# Patient Record
Sex: MALE | Race: WHITE | NOT HISPANIC OR LATINO | Employment: UNEMPLOYED | URBAN - METROPOLITAN AREA
[De-identification: names, ages, dates, MRNs, and addresses within clinical notes are randomized per-mention and may not be internally consistent; named-entity substitution may affect disease eponyms.]

---

## 2017-01-23 RX ORDER — BUPROPION HYDROCHLORIDE 150 MG/1
TABLET ORAL
Qty: 90 TABLET | Refills: 0 | OUTPATIENT
Start: 2017-01-23

## 2018-04-04 ENCOUNTER — HOSPITAL ENCOUNTER (EMERGENCY)
Facility: HOSPITAL | Age: 53
Discharge: PSYCHIATRIC OR SUBSTANCE USE FACILITY | End: 2018-04-05
Attending: EMERGENCY MEDICINE
Payer: MEDICAID

## 2018-04-04 DIAGNOSIS — R45.851 SUICIDAL IDEATION: Primary | ICD-10-CM

## 2018-04-04 LAB
BASOPHILS # BLD AUTO: 0.11 K/UL
BASOPHILS NFR BLD: 1.2 %
DIFFERENTIAL METHOD: NORMAL
EOSINOPHIL # BLD AUTO: 0.1 K/UL
EOSINOPHIL NFR BLD: 1.1 %
ERYTHROCYTE [DISTWIDTH] IN BLOOD BY AUTOMATED COUNT: 12.9 %
HCT VFR BLD AUTO: 46.2 %
HGB BLD-MCNC: 15.3 G/DL
IMM GRANULOCYTES # BLD AUTO: 0.03 K/UL
IMM GRANULOCYTES NFR BLD AUTO: 0.3 %
LYMPHOCYTES # BLD AUTO: 2.6 K/UL
LYMPHOCYTES NFR BLD: 28.6 %
MCH RBC QN AUTO: 28.9 PG
MCHC RBC AUTO-ENTMCNC: 33.1 G/DL
MCV RBC AUTO: 87 FL
MONOCYTES # BLD AUTO: 0.4 K/UL
MONOCYTES NFR BLD: 4.2 %
NEUTROPHILS # BLD AUTO: 5.9 K/UL
NEUTROPHILS NFR BLD: 64.6 %
NRBC BLD-RTO: 0 /100 WBC
PLATELET # BLD AUTO: 342 K/UL
PMV BLD AUTO: 10.7 FL
RBC # BLD AUTO: 5.3 M/UL
WBC # BLD AUTO: 9.14 K/UL

## 2018-04-04 PROCEDURE — 84443 ASSAY THYROID STIM HORMONE: CPT

## 2018-04-04 PROCEDURE — 81003 URINALYSIS AUTO W/O SCOPE: CPT

## 2018-04-04 PROCEDURE — 85025 COMPLETE CBC W/AUTO DIFF WBC: CPT

## 2018-04-04 PROCEDURE — 80320 DRUG SCREEN QUANTALCOHOLS: CPT

## 2018-04-04 PROCEDURE — 80307 DRUG TEST PRSMV CHEM ANLYZR: CPT

## 2018-04-04 PROCEDURE — 80053 COMPREHEN METABOLIC PANEL: CPT

## 2018-04-04 PROCEDURE — 80329 ANALGESICS NON-OPIOID 1 OR 2: CPT

## 2018-04-05 VITALS
OXYGEN SATURATION: 97 % | WEIGHT: 230 LBS | HEIGHT: 75 IN | BODY MASS INDEX: 28.6 KG/M2 | DIASTOLIC BLOOD PRESSURE: 82 MMHG | SYSTOLIC BLOOD PRESSURE: 128 MMHG | RESPIRATION RATE: 16 BRPM | HEART RATE: 51 BPM | TEMPERATURE: 98 F

## 2018-04-05 LAB
ALBUMIN SERPL BCP-MCNC: 4.1 G/DL
ALP SERPL-CCNC: 53 U/L
ALT SERPL W/O P-5'-P-CCNC: 25 U/L
AMPHET+METHAMPHET UR QL: NEGATIVE
ANION GAP SERPL CALC-SCNC: 9 MMOL/L
APAP SERPL-MCNC: <3 UG/ML
AST SERPL-CCNC: 22 U/L
BARBITURATES UR QL SCN>200 NG/ML: NEGATIVE
BENZODIAZ UR QL SCN>200 NG/ML: NEGATIVE
BILIRUB SERPL-MCNC: 0.5 MG/DL
BILIRUB UR QL STRIP: NEGATIVE
BUN SERPL-MCNC: 28 MG/DL
BZE UR QL SCN: NEGATIVE
CALCIUM SERPL-MCNC: 9.2 MG/DL
CANNABINOIDS UR QL SCN: NEGATIVE
CHLORIDE SERPL-SCNC: 108 MMOL/L
CLARITY UR REFRACT.AUTO: ABNORMAL
CO2 SERPL-SCNC: 24 MMOL/L
COLOR UR AUTO: YELLOW
CREAT SERPL-MCNC: 1.2 MG/DL
CREAT UR-MCNC: 370 MG/DL
EST. GFR  (AFRICAN AMERICAN): >60 ML/MIN/1.73 M^2
EST. GFR  (NON AFRICAN AMERICAN): >60 ML/MIN/1.73 M^2
ETHANOL SERPL-MCNC: <10 MG/DL
GLUCOSE SERPL-MCNC: 207 MG/DL
GLUCOSE UR QL STRIP: NEGATIVE
HGB UR QL STRIP: NEGATIVE
KETONES UR QL STRIP: ABNORMAL
LEUKOCYTE ESTERASE UR QL STRIP: NEGATIVE
METHADONE UR QL SCN>300 NG/ML: NEGATIVE
NITRITE UR QL STRIP: NEGATIVE
OPIATES UR QL SCN: NEGATIVE
PCP UR QL SCN>25 NG/ML: NEGATIVE
PH UR STRIP: 5 [PH] (ref 5–8)
POTASSIUM SERPL-SCNC: 4.1 MMOL/L
PROT SERPL-MCNC: 7.4 G/DL
PROT UR QL STRIP: NEGATIVE
SODIUM SERPL-SCNC: 141 MMOL/L
SP GR UR STRIP: >=1.03 (ref 1–1.03)
TOXICOLOGY INFORMATION: NORMAL
TSH SERPL DL<=0.005 MIU/L-ACNC: 0.99 UIU/ML
URN SPEC COLLECT METH UR: ABNORMAL
UROBILINOGEN UR STRIP-ACNC: 2 EU/DL

## 2018-04-05 PROCEDURE — 99285 EMERGENCY DEPT VISIT HI MDM: CPT

## 2018-04-05 PROCEDURE — 99285 EMERGENCY DEPT VISIT HI MDM: CPT | Mod: ,,, | Performed by: PHYSICIAN ASSISTANT

## 2018-04-05 RX ORDER — LORAZEPAM 0.5 MG/1
0.5 TABLET ORAL EVERY 4 HOURS PRN
Status: DISCONTINUED | OUTPATIENT
Start: 2018-04-05 | End: 2018-04-05 | Stop reason: HOSPADM

## 2018-04-05 NOTE — ED NOTES
Pt remains in paper scrubs, resting in stretcher comfortably. No signs of distress noted. Psych remains at bedside. Sitter remains at bedside in direct visual contact, charting per protocol every 15 minutes. No equipment or belongings are in the patients room.  Will continue to monitor.

## 2018-04-05 NOTE — ED NOTES
Report called to TOLU Barrett. Patient transported to [facility] with RN and security x2. Transport paper signed by ED staff and original PEC sent with patient. patient updated on plan of care, aware of transport [facility]. Per JUAN Nascimento ok for patient to get cell phone to txt friend of transfer location.

## 2018-04-05 NOTE — ED PROVIDER NOTES
"Encounter Date: 4/4/2018       History     Chief Complaint   Patient presents with    Suicidal     Patient reports he has been depressed and has been having suicidal thoughts     52 year old male with Depression and Bipolar 2 presenting for depression for 7 weeks and worsening suicidal thoughts. Denies plan or attempt but has been thinking about suicide near constantly. He states that this has happened several times in the past. Reports associated difficulty sleeping and decreased appetite. Denies homicidal ideations, hallucinations or delusions. Not on any psych medication at this time, does not have an outpatient psychiatrist. Denies alcohol or drug use today. Admits to head "inflammation" but denies any physical pain at this time.          Review of patient's allergies indicates:  No Known Allergies  No past medical history on file.  No past surgical history on file.  No family history on file.  Social History   Substance Use Topics    Smoking status: Never Smoker    Smokeless tobacco: Not on file    Alcohol use Not on file     Review of Systems   Constitutional: Positive for appetite change. Negative for fever.   HENT: Negative for sore throat.    Eyes: Negative for visual disturbance.   Respiratory: Negative for cough and shortness of breath.    Cardiovascular: Negative for chest pain.   Gastrointestinal: Negative for abdominal pain, nausea and vomiting.   Genitourinary: Negative for difficulty urinating.   Musculoskeletal: Positive for back pain.   Skin: Negative for rash.   Allergic/Immunologic: Negative for immunocompromised state.   Neurological: Positive for headaches.   Psychiatric/Behavioral: Positive for dysphoric mood, self-injury, sleep disturbance and suicidal ideas. Negative for hallucinations. The patient is nervous/anxious.        Physical Exam     Initial Vitals [04/04/18 2228]   BP Pulse Resp Temp SpO2   136/85 73 18 98.8 °F (37.1 °C) 97 %      MAP       102         Physical Exam    Nursing " note and vitals reviewed.  Constitutional: He appears well-developed and well-nourished. He is not diaphoretic. No distress.   HENT:   Head: Normocephalic and atraumatic.   Eyes: EOM are normal. Pupils are equal, round, and reactive to light.   Neck: Normal range of motion. Neck supple.   Cardiovascular: Normal rate, regular rhythm, normal heart sounds and intact distal pulses. Exam reveals no gallop and no friction rub.    No murmur heard.  Pulmonary/Chest: Breath sounds normal. No respiratory distress. He has no wheezes. He has no rhonchi. He has no rales. He exhibits no tenderness.   Abdominal: Soft. Bowel sounds are normal. He exhibits no distension and no mass. There is no tenderness. There is no rebound and no guarding.   Musculoskeletal: Normal range of motion.   Neurological: He is alert and oriented to person, place, and time.   Skin: Skin is warm and dry.   Psychiatric: His speech is normal and behavior is normal. His affect is not inappropriate. He is not actively hallucinating. Thought content is not paranoid and not delusional. He exhibits a depressed mood. He expresses suicidal ideation. He expresses no homicidal ideation. He expresses no suicidal plans and no homicidal plans. He is attentive.         ED Course   Procedures  Labs Reviewed   CBC W/ AUTO DIFFERENTIAL   COMPREHENSIVE METABOLIC PANEL   TSH   URINALYSIS   DRUG SCREEN PANEL, URINE EMERGENCY   ALCOHOL,MEDICAL (ETHANOL)   ACETAMINOPHEN LEVEL                   APC / Resident Notes:   52 year old male with suicidal ideations without plan or attempt. Patient reported to nurse that he Googles ways to kill himself and has looked into physician assisted suicide. Will obtain psych labs and PEC for admission. No beds available at this facility, patient will need to be transferred pending medical clearance.    Psychiatry spoke at length with patient and feel he needs to be admitted. Patient is medically cleared and ready for transfer to psych facility.  I discussed this patient with my supervising physician.     Pily Ervin PA-C                   Clinical Impression:   The encounter diagnosis was Suicidal ideation.    Disposition:   Disposition: Transferred  Condition: Stable                        Pily Ervin PA-C  04/05/18 0152

## 2018-04-05 NOTE — SUBJECTIVE & OBJECTIVE
Patient History           Medical as of 4/5/2018     Past Medical History     Diagnosis Date Comments Source    Depression -- -- Provider                  Surgical as of 4/5/2018     Past Surgical History     Procedure Laterality Date Comments Source    HERNIA REPAIR -- -- -- Provider                  Family as of 4/5/2018    **None**           Tobacco Use as of 4/5/2018     Smoking Status Smoking Start Date Smoking Quit Date Packs/day Years Used    Never Smoker -- -- -- --    Types Comments Smokeless Tobacco Status Smokeless Tobacco Quit Date Source    -- -- Unknown -- Provider            Alcohol Use as of 4/5/2018     Alcohol Use Drinks/Week Alcohol/Week Comments Source    No 0 Standard drinks or equivalent 0.0 oz -- Provider            Drug Use as of 4/5/2018     Drug Use Types Frequency Comments Source    Yes  Marijuana -- occasionally Provider            Sexual Activity as of 4/5/2018     Sexually Active Birth Control Partners Comments Source    Not Asked -- -- -- Provider            Activities of Daily Living as of 4/5/2018    **None**           Social Documentation as of 4/5/2018    **None**           Occupational as of 4/5/2018    **None**           Socioeconomic as of 4/5/2018     Marital Status Spouse Name Number of Children Years Education Preferred Language Ethnicity Race Source    Single -- -- -- English Other Unknown --         Pertinent History Q A Comments    as of 4/5/2018 Lives with      Place in Birth Order      Lives in      Number of Siblings      Raised by      Legal Involvement      Childhood Trauma      Criminal History of      Financial Status      Highest Level of Education      Does patient have access to a firearm?       Service      Primary Leisure Activity      Spirituality       Past Medical History:   Diagnosis Date    Depression      Past Surgical History:   Procedure Laterality Date    HERNIA REPAIR       Family History     None        Social History Main Topics     "Smoking status: Never Smoker    Smokeless tobacco: Not on file    Alcohol use No    Drug use: Yes     Types: Marijuana      Comment: occasionally    Sexual activity: Not on file     Review of patient's allergies indicates:  No Known Allergies    No current facility-administered medications on file prior to encounter.      No current outpatient prescriptions on file prior to encounter.     Psychotherapeutics     Start     Stop Route Frequency Ordered    04/05/18 0306  LORazepam tablet 0.5 mg     Question:  Is the patient competent?  Answer:  No    -- Oral Every 4 hours PRN 04/05/18 0207        Review of Systems  Strengths and Liabilities: Strength: Patient is expressive/articulate., Strength: Patient is intelligent., Liability: Patient lacks coping skills.    Objective:     Vital Signs (Most Recent):  Temp: 98 °F (36.7 °C) (04/05/18 0245)  Pulse: (!) 51 (04/05/18 0245)  Resp: 16 (04/05/18 0245)  BP: 128/82 (04/05/18 0245)  SpO2: 97 % (04/04/18 2228) Vital Signs (24h Range):  Temp:  [98 °F (36.7 °C)-98.8 °F (37.1 °C)] 98 °F (36.7 °C)  Pulse:  [51-73] 51  Resp:  [16-18] 16  SpO2:  [97 %] 97 %  BP: (128-136)/(82-85) 128/82     Height: 6' 3" (190.5 cm)  Weight: 104.3 kg (230 lb)  Body mass index is 28.75 kg/m².    No intake or output data in the 24 hours ending 04/05/18 0312    Physical Exam   Appearance: malodorous, wearing paper scrubs in NAD  Behavior: calm, cooperative  Speech: normal rate, tone, and volume  Mood: "depressed"  Affect: mood-incongruent, appears euthymic  Thought Process: linear  Thought Perceptions: denied AVH  Thought Content: +passive Si; no HI; no delusions apparent  Sensorium: awake, alert  Attention/Concentration: intact to conversation  Orientation: person, place, time, and situation  Memory: intact (recent, remote)  Abstraction: intact   Insight: fair  Judgment: fair       Significant Labs:   Recent Results (from the past 48 hour(s))   CBC auto differential    Collection Time: 04/04/18 " 11:20 PM   Result Value Ref Range    WBC 9.14 3.90 - 12.70 K/uL    RBC 5.30 4.60 - 6.20 M/uL    Hemoglobin 15.3 14.0 - 18.0 g/dL    Hematocrit 46.2 40.0 - 54.0 %    MCV 87 82 - 98 fL    MCH 28.9 27.0 - 31.0 pg    MCHC 33.1 32.0 - 36.0 g/dL    RDW 12.9 11.5 - 14.5 %    Platelets 342 150 - 350 K/uL    MPV 10.7 9.2 - 12.9 fL    Immature Granulocytes 0.3 0.0 - 0.5 %    Gran # (ANC) 5.9 1.8 - 7.7 K/uL    Immature Grans (Abs) 0.03 0.00 - 0.04 K/uL    Lymph # 2.6 1.0 - 4.8 K/uL    Mono # 0.4 0.3 - 1.0 K/uL    Eos # 0.1 0.0 - 0.5 K/uL    Baso # 0.11 0.00 - 0.20 K/uL    nRBC 0 0 /100 WBC    Gran% 64.6 38.0 - 73.0 %    Lymph% 28.6 18.0 - 48.0 %    Mono% 4.2 4.0 - 15.0 %    Eosinophil% 1.1 0.0 - 8.0 %    Basophil% 1.2 0.0 - 1.9 %    Differential Method Automated    Comprehensive metabolic panel    Collection Time: 04/04/18 11:20 PM   Result Value Ref Range    Sodium 141 136 - 145 mmol/L    Potassium 4.1 3.5 - 5.1 mmol/L    Chloride 108 95 - 110 mmol/L    CO2 24 23 - 29 mmol/L    Glucose 207 (H) 70 - 110 mg/dL    BUN, Bld 28 (H) 6 - 20 mg/dL    Creatinine 1.2 0.5 - 1.4 mg/dL    Calcium 9.2 8.7 - 10.5 mg/dL    Total Protein 7.4 6.0 - 8.4 g/dL    Albumin 4.1 3.5 - 5.2 g/dL    Total Bilirubin 0.5 0.1 - 1.0 mg/dL    Alkaline Phosphatase 53 (L) 55 - 135 U/L    AST 22 10 - 40 U/L    ALT 25 10 - 44 U/L    Anion Gap 9 8 - 16 mmol/L    eGFR if African American >60.0 >60 mL/min/1.73 m^2    eGFR if non African American >60.0 >60 mL/min/1.73 m^2   TSH    Collection Time: 04/04/18 11:20 PM   Result Value Ref Range    TSH 0.989 0.400 - 4.000 uIU/mL   Ethanol    Collection Time: 04/04/18 11:20 PM   Result Value Ref Range    Alcohol, Medical, Serum <10 <10 mg/dL   Acetaminophen level    Collection Time: 04/04/18 11:20 PM   Result Value Ref Range    Acetaminophen (Tylenol), Serum <3.0 (L) 10.0 - 20.0 ug/mL   Urinalysis    Collection Time: 04/04/18 11:50 PM   Result Value Ref Range    Specimen UA Urine, Clean Catch     Color, UA Yellow Yellow,  Straw, Melvi    Appearance, UA Hazy (A) Clear    pH, UA 5.0 5.0 - 8.0    Specific Gravity, UA >=1.030 (A) 1.005 - 1.030    Protein, UA Negative Negative    Glucose, UA Negative Negative    Ketones, UA Trace (A) Negative    Bilirubin (UA) Negative Negative    Occult Blood UA Negative Negative    Nitrite, UA Negative Negative    Urobilinogen, UA 2.0 <2.0 EU/dL    Leukocytes, UA Negative Negative   Drug screen panel, emergency    Collection Time: 04/04/18 11:50 PM   Result Value Ref Range    Benzodiazepines Negative     Methadone metabolites Negative     Cocaine (Metab.) Negative     Opiate Scrn, Ur Negative     Barbiturate Screen, Ur Negative     Amphetamine Screen, Ur Negative     THC Negative     Phencyclidine Negative     Creatinine, Random Ur 370.0 23.0 - 375.0 mg/dL    Toxicology Information SEE COMMENT       No results found for: PHENYTOIN, PHENOBARB, VALPROATE, CBMZ      Significant Imaging: I have reviewed all pertinent imaging results/findings within the past 24 hours.

## 2018-04-05 NOTE — ED NOTES
[place] and juice served; patient ate; patient went to restroom and back to bed. Now resting quietly in bed. Respirations even and non labored; skin warm and dry. Safety maintained.

## 2018-04-05 NOTE — ED NOTES
[facility] here to  patient. Patient stated he does not want family/next of kin notified at this time [done earlier]. Patient belongings one bag secured into SPD vehicle separate from patient. Patient transferred per self and secured into SPD vehicle with Staff/Security Guards in attendance. Safety maintained.

## 2018-04-05 NOTE — ED NOTES
[facility] accepted to [facility]. Accepting Dr. Rehana MD Call [phone] per SERAFIN Coleman. Please allow 10minutes before call report. SERAFIN Lazaro was informed.

## 2018-04-05 NOTE — ED NOTES
Admission packet faxed to [[facility]] [facility], [facility], [facility], [facility], [facility], [facility], [[facility][[place]], [facility], [[facility], [facility], [facility], [facility], [facility], [[facility], [facility], [facility], [facility], [facility], [facility]. Awaiting responses.

## 2018-04-05 NOTE — ASSESSMENT & PLAN NOTE
1. Dispo/Legal Status:  Cont PEC at this time as the pt is currently a danger to self. Seek inpt bed for pt safety and stabilization when/if medically cleared by the ER MD.  2. Scheduled Medications: defer to inpt unit. Defer any non-psych meds to the ER MD.   3. PRN Medications: n/a  4. Precautions/Nursing:  suicide  5. To-Do: Continue to observe pt's behavior while in the ER and will reassess the pt daily until placement is found.  6. Case Discussed with: Dr. Fortune

## 2018-04-05 NOTE — ED NOTES
Pt received to [facility] dept via wheelchair and staff/. Pt in hospital provided paper scrubs. Pt has been searched for contraband and all personal belongings retrieved and secured in locked area away from pt. [facility] has been notified of room change and original PEC document filed in chart. Room is free of environmental hazards, direct visual observation maintained.

## 2018-04-05 NOTE — ED NOTES
LOC: The patient is awake, alert, and oriented to place, time, situation. Affect is appropriate. Speech is appropriate and clear.       APPEARANCE: Patient resting comfortably in no acute distress. Patient is clean and well groomed.     SKIN: The skin is warm and dry; color consistent with ethnicity. Patient has normal skin turgor and moist mucus membranes. Skin intact; no breakdown or bruising noted.      MUSCULOSKELETAL: Patient moving upper and lower extremities without difficulty. Denies weakness.       RESPIRATORY: Airway is open and patent. Respirations spontaneous, even, easy, and non-labored. Patient has a normal effort and rate. No accessory muscle use noted. Denies cough.       CARDIAC: Normal rhythm and rate noted. No peripheral edema noted. No complaints of chest pain.       ABDOMEN: Soft and non tender to palpation. No distention noted.       NEUROLOGIC: Eyes open spontaneously. Behavior appropriate to situation. Follows commands; facial expression symmetrical. Purposeful motor response noted; normal sensation in all extremities.

## 2018-04-05 NOTE — ED TRIAGE NOTES
Pt reports severe depression since the age of 22. Pt reports recently having increased depression resulting in him looking up online ways to coming suicide. Pt reports not sleeping, but staying in bed all day. Pt reports he does not think he will be able to follow through with any attempts. Patient reports extensive research online with suicide plans as well as assisted suicide options. Pt denies any recent triggers to his depressive state. Pt arrived with his friend Liam who the patient reports is ok to release information on plan of care as well as if patient is transferred.

## 2018-04-05 NOTE — ED NOTES
Packet faxed to: [facility], [facility], [facility], [place], [place], [facility], [facility], [facility], [facility], [place], [facility], [facility], [facility], [facility], [facility].

## 2018-04-05 NOTE — ED NOTES
Psych at bedside. Pt remains in paper scrubs, resting in stretcher comfortably. No signs of distress noted. Sitter remains at bedside in direct visual contact, charting per protocol every 15 minutes. No equipment or belongings are in the patients room. Will continue to monitor.
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
## 2018-04-17 ENCOUNTER — NURSE TRIAGE (OUTPATIENT)
Dept: ADMINISTRATIVE | Facility: CLINIC | Age: 53
End: 2018-04-17

## 2018-04-17 NOTE — TELEPHONE ENCOUNTER
"  Reason for Disposition   General information question, no triage required and triager able to answer question    Answer Assessment - Initial Assessment Questions  1. REASON FOR CALL or QUESTION: "What is your reason for calling today?" or "How can I best help you?" or "What question do you have that I can help answer?"      Pt wants to get lab result information to send to dr's office    Protocols used: ST INFORMATION ONLY CALL-A-AH    "

## 2018-08-20 DIAGNOSIS — Z12.11 SPECIAL SCREENING FOR MALIGNANT NEOPLASM OF COLON: Primary | ICD-10-CM

## 2018-08-21 ENCOUNTER — OFFICE VISIT (OUTPATIENT)
Dept: SLEEP MEDICINE | Facility: CLINIC | Age: 53
End: 2018-08-21
Payer: MEDICAID

## 2018-08-21 VITALS
SYSTOLIC BLOOD PRESSURE: 110 MMHG | HEART RATE: 64 BPM | BODY MASS INDEX: 29.92 KG/M2 | HEIGHT: 75 IN | DIASTOLIC BLOOD PRESSURE: 80 MMHG | WEIGHT: 240.63 LBS

## 2018-08-21 DIAGNOSIS — G47.33 OBSTRUCTIVE SLEEP APNEA: Primary | ICD-10-CM

## 2018-08-21 PROCEDURE — 99214 OFFICE O/P EST MOD 30 MIN: CPT | Mod: S$PBB,,, | Performed by: NURSE PRACTITIONER

## 2018-08-21 PROCEDURE — 99213 OFFICE O/P EST LOW 20 MIN: CPT | Mod: PBBFAC | Performed by: NURSE PRACTITIONER

## 2018-08-21 PROCEDURE — 99999 PR PBB SHADOW E&M-EST. PATIENT-LVL III: CPT | Mod: PBBFAC,,, | Performed by: NURSE PRACTITIONER

## 2018-08-21 RX ORDER — CLONAZEPAM 0.5 MG/1
TABLET ORAL
Refills: 0 | COMMUNITY
Start: 2018-07-10 | End: 2018-09-29

## 2018-08-21 NOTE — PROGRESS NOTES
"This 52 y.o. year-old male returns for management of obstructive sleep apnea and CPAP equipment check. Last seen in clinic by TRI Petersen NP 06/09/2016. This is his initial visit with me.     Since last clinic visit pt has completed CPAP titration study at [facility] on 06/11/2018 that showed optimal fixed CPAP 14 cm effectively resolved JEANNINE events. Copy of titration study reviewed today.   Returns today after set up of CPAP machine at [facility].     The patient symptomatically has excessive daytime sleepiness, excessive daytime fatigue, snoring, and interrupted sleep resolved with CPAP use.   Finds depression/mood better controlled with regular CPAP use.     Denies oral nasal drying with Dreamwear FFM.   Denies pressure intolerance, aerophagia, or congestion.     CPAP Interrogation: Resmed Airsense 10, CPAP 14 cm, Days Used: 26/30, > 4 hours: 23/30, Average Usage: 7.1 hours, Used Hours: 183.3, Leak 22 L/min, Predicted AHI: 1.5    On today's [place] Sleepiness Scale the patient scores a 9.    CPAP titration study @ [facility] 06/11/2018 CPAP 14 cm  Baseline Sleep Study: PSG/ SPLIT night study  In 2015 [facility].   showed significant JEANINNE with the AHI of 20/hour and SaO2 minimum of 96 %. Pressure 8 cm was advised    Review of Systems:   Sleep related symptoms as per HPI.  Otherwise, a balance of 10 systems reviewed is negative.    Physical Exam:   /80 (BP Location: Right arm, Patient Position: Sitting, BP Method: Large (Manual))   Pulse 64   Ht 6' 3" (1.905 m)   Wt 109.1 kg (240 lb 10.1 oz)   BMI 30.08 kg/m²    GENERAL: Well groomed    Assessment:      Obstructive sleep apnea, moderate by AHI criteria,  with prior symptoms of excessive daytime sleepiness, excessive daytime fatigue, snoring and interrupted sleep, now resolved with CPAP use. The patient is adherent on CPAP and experiencing symptomatic benefit. Medical co-mobidities: depression.    Plan:     Continue CPAP therapy at 14 cm H2O. Discussed " at length CPAP vs APAP. Rx updated to reflect heated tubing.     Education: During our discussion today, we talked about the etiology of obstructive sleep apnea as well as the potential ramifications of untreated sleep apnea, which could include daytime sleepiness, hypertension, heart disease and/or stroke. We discussed potential treatment options, which could include weight loss, body positioning, continuous positive airway pressure (CPAP), or referral for surgical consideration.     Behavior modification which includes losing weight, exercising, changing the sleep position, abstaining from alcohol, and avoiding certain medications    Precautions: The patient was advised to abstain from driving should they feel sleepy or drowsy    RTC in 12 months, sooner if needed.

## 2018-09-29 ENCOUNTER — HOSPITAL ENCOUNTER (EMERGENCY)
Facility: HOSPITAL | Age: 53
Discharge: HOME | End: 2018-09-29
Attending: EMERGENCY MEDICINE
Payer: MEDICAID

## 2018-09-29 VITALS
BODY MASS INDEX: 30.42 KG/M2 | WEIGHT: 244.69 LBS | TEMPERATURE: 99 F | SYSTOLIC BLOOD PRESSURE: 111 MMHG | DIASTOLIC BLOOD PRESSURE: 70 MMHG | RESPIRATION RATE: 20 BRPM | HEART RATE: 77 BPM | HEIGHT: 75 IN | OXYGEN SATURATION: 97 %

## 2018-09-29 DIAGNOSIS — R60.0 PEDAL EDEMA: ICD-10-CM

## 2018-09-29 DIAGNOSIS — S91.002A ANKLE WOUND, LEFT, INITIAL ENCOUNTER: Primary | ICD-10-CM

## 2018-09-29 PROCEDURE — 99284 EMERGENCY DEPT VISIT MOD MDM: CPT | Mod: ,,, | Performed by: EMERGENCY MEDICINE

## 2018-09-29 PROCEDURE — 99284 EMERGENCY DEPT VISIT MOD MDM: CPT | Mod: 25

## 2018-09-29 RX ORDER — MUPIROCIN 20 MG/G
OINTMENT TOPICAL 3 TIMES DAILY
COMMUNITY

## 2018-09-29 RX ORDER — DOXYCYCLINE 100 MG/1
100 CAPSULE ORAL 2 TIMES DAILY
Qty: 20 CAPSULE | Refills: 0 | Status: SHIPPED | OUTPATIENT
Start: 2018-09-29 | End: 2018-10-09

## 2018-09-29 RX ORDER — SULFAMETHOXAZOLE AND TRIMETHOPRIM 800; 160 MG/1; MG/1
1 TABLET ORAL
COMMUNITY
End: 2023-03-31

## 2018-09-29 NOTE — DISCHARGE INSTRUCTIONS
You have been evaluated for ankle swelling. An ultrasound of your left leg was negative for blood clots.   Please begin taking Doxycycline as prescribed for 10 days and continue taking Bactrim until finished.    Return to the ED should you experience the following symptoms: fever/chills, increased redness or swelling of your ankle or foot, numbness/tingling of your ankle or foot, severe pain to the area, yellow/green discharge from the area, or any other concerning symptoms.

## 2018-09-29 NOTE — ED TRIAGE NOTES
Presents to [facility] with complaint of pain and swelling to his left ankle.  States that he thinks he was bitten by something 1 month ago.  States that he has been on ABX for about 14 days but the area is no better.  Patient's name and date of birth checked and is correct.    LOC: The patient is awake, alert, and oriented to place, time, situation. Affect is appropriate.  Speech is appropriate and clear.      APPEARANCE: Patient resting comfortably, reporting palpation, light headedness,  in no acute distress.  Patient is clean and well groomed.     SKIN: The skin is warm and dry; color consistent with ethnicity.  Patient has normal skin turgor and moist mucus membranes.  Skin intact; no breakdown or bruising noted.      MUSCULOSKELETAL: Patient moving upper and lower extremities without difficulty.  Denies weakness.      RESPIRATORY: Airway is open and patent. Respirations spontaneous, even, easy, and non-labored.  Patient has a normal effort and rate.  No accessory muscle use noted. Denies cough.  BS clear.     CARDIAC:  No peripheral edema noted. No complaints of chest pain.       ABDOMEN: Soft and non tender to palpation.  No distention noted.      NEUROLOGIC: Eyes open spontaneously.  Behavior appropriate to situation.  Follows commands; facial expression symmetrical.  Purposeful motor response noted; normal sensation in all extremities.

## 2018-09-29 NOTE — ED PROVIDER NOTES
Encounter Date: 9/29/2018    SCRIBE #1 NOTE: Pablo ARREOLA, mo scribing for, and in the presence of, Dr. Arteaga . the APC attestation.       History     Chief Complaint   Patient presents with    Leg Pain     ?spider bite to L ankle, on antibiotics, now swollen     Patient is a 53-yo white male with PMHx of JEANNINE on CPAP presents to the ED for urgent evaluation of leg pain. Patient states that about six weeks ago he noticed an insect bite to his left ankle that became swollen and erythematous. He presented to his PCP and was started on Bactrim and Bactroban cream for treatment of a skin abscess. After minimal change in size, he started an extended Bactrim regimen and is now on day 14 of 20 days of antibiotics. He presents today because he states 24 hours ago his ankle became significantly swollen. He reports the wound site itself has not changed, but is consistently pruritic with spontaneous clear drainage. He does endorse saltwater exposure while kayaking about 2 weeks ago without significant change in wound. He denies ankle trauma or activity changes. He denies fever/chills, decreased ROM, numbness/tingling, chest pain, SOB, abdominal pain, arm or leg weakness. He denies long period of immobilization.       The history is provided by the patient.     Review of patient's allergies indicates:  No Known Allergies  Past Medical History:   Diagnosis Date    Depression      Past Surgical History:   Procedure Laterality Date    HERNIA REPAIR       History reviewed. No pertinent family history.  Social History     Tobacco Use    Smoking status: Never Smoker    Smokeless tobacco: Never Used   Substance Use Topics    Alcohol use: No     Alcohol/week: 0.0 oz    Drug use: Yes     Types: Marijuana     Comment: occasionally     Review of Systems   Constitutional: Negative for activity change, appetite change, chills and fever.   HENT: Negative for sore throat.    Eyes: Negative for visual disturbance.   Respiratory:  Positive for cough (chronic). Negative for chest tightness and shortness of breath.    Cardiovascular: Positive for leg swelling. Negative for chest pain.   Gastrointestinal: Negative for abdominal pain, nausea and vomiting.   Genitourinary: Negative for dysuria.   Musculoskeletal: Negative for arthralgias, back pain, gait problem and myalgias.   Skin: Positive for wound.   Neurological: Negative for dizziness, weakness, numbness and headaches.   Psychiatric/Behavioral: Negative for confusion.       Physical Exam     Initial Vitals [09/29/18 1040]   BP Pulse Resp Temp SpO2   (!) 144/86 95 18 98.8 °F (37.1 °C) 98 %      MAP       --         Physical Exam    Nursing note and vitals reviewed.  Constitutional: He appears well-developed and well-nourished. He is not diaphoretic. No distress.   HENT:   Head: Normocephalic and atraumatic.   Right Ear: External ear normal.   Left Ear: External ear normal.   Nose: Nose normal.   Eyes: Conjunctivae and EOM are normal. Pupils are equal, round, and reactive to light.   Neck: Normal range of motion. Neck supple.   Cardiovascular: Normal rate, regular rhythm, normal heart sounds and intact distal pulses.   Pulses:       Dorsalis pedis pulses are 2+ on the right side, and 2+ on the left side.        Posterior tibial pulses are 2+ on the right side, and 2+ on the left side.   Pulmonary/Chest: Breath sounds normal.   Abdominal: Soft. Bowel sounds are normal.   Musculoskeletal: Normal range of motion.        Feet:    Neurological: He is alert and oriented to person, place, and time. He has normal strength.   Skin: Skin is warm and dry.   Psychiatric: He has a normal mood and affect. Thought content normal.         ED Course   Procedures  Labs Reviewed - No data to display       Imaging Results          US Lower Extremity Veins Left (Final result)  Result time 09/29/18 13:06:26    Final result by Demetrius Washington III, MD (09/29/18 13:06:26)                 Impression:      No evidence  of deep venous thrombosis in the left lower extremity.    Electronically signed by resident: Dre Larson  Date:    09/29/2018  Time:    13:04    Electronically signed by: Demetrius Washington MD  Date:    09/29/2018  Time:    13:06             Narrative:    EXAMINATION:  US LOWER EXTREMITY VEINS LEFT    CLINICAL HISTORY:  Localized edema    TECHNIQUE:  Duplex and color flow Doppler evaluation and graded compression of the left lower extremity veins was performed.    COMPARISON:  None.    FINDINGS:  Left thigh veins: The common femoral, femoral, popliteal, upper greater saphenous, and deep femoral veins are patent and free of thrombus. The veins are normally compressible and have normal phasic flow and augmentation response.    Left calf veins: The visualized calf veins are patent.    Contralateral CFV: The contralateral (right) common femoral vein is patent and free of thrombus.    Miscellaneous: None.                                 Medical Decision Making:   History:   Old Medical Records: I decided to obtain old medical records.  Initial Assessment:   53WM presents to the ED for urgent evaluation of left ankle swelling x1 day. Reports insect bite to lateral left ankle six weeks ago and was started on Bactrim 2 weeks ago with minimal change in bite size. States ankle swelling began 24h ago. No trauma. PE reveals non-toxic, afebrile, well-appearing male. VSS. There is significant foot and ankle edema of the left foot with a small open wound noted to the left lateral ankle. Wound exhibits mild surrounding erythema with small amount of serous drainage. No significant swelling, fluctuance, or tenderness at the bite site. Patient has intact sensation and full ROM of LE. DP and PT pulses 2+ bilaterally.  Differential Diagnosis:   DDx includes but is not limited to: cellulitis, DVT, ankle strain/sprain.  Clinical Tests:   Radiological Study: Ordered and Reviewed  ED Management:  Will order US LE to rule out DVT. I do not  suspect cellulitis at this time as the insect bite site is only mildly erythematous with clear drainage with pressure application.     DVT negative. Will discharge patient with Doxycycline 100 mg BID x 10 days for vibrio coverage and instructed him to finish Bactrim as prescribed. Patient educated about RICE therapy to alleviate ankle edema and advised to attend his upcoming appointment with his PCP on Wednesday. ED return precautions given. Patient verbalized understanding and agrees with plan. I discussed patient case with my supervising physician who agrees with my assessment.            Scribe Attestation:   Scribe #1: I performed the above scribed service and the documentation accurately describes the services I performed. I attest to the accuracy of the note.    Attending Attestation:     Physician Attestation Statement for NP/PA:   I have conducted a face to face encounter with this patient in addition to the NP/PA, due to Medical Complexity    Other NP/PA Attestation Additions:    History of Present Illness: 53 year old man who states he had an insect bite to the left lateral ankle 6 weeks ago while mowing his grass. Two weeks later he had some exposure to sea water but reports this did not make the wound worse. When it did not go away, he requested his PCP look at it and he was placed on Bactrim, this was two weeks ago. After completing a course of Bactrim, the wound was still present so antibiotics was continued. He's now on day four of the second course but awoke with his ankle and foot very swollen. He denies any pain, no fever; denies any injury to the other foot or ankle.    Physical Exam: 2+ edema to the left foot and ankle that extends to the lower left leg, just proximal to the wound which is a small erythematous lesion draining serous fluid. There is a slight amount of surrounding erythema that extends inferiorly, no fluctuance, no tenderness. Full range of motion of the ankle. DP pulse 1+ on the  left.      Medical Decision Making: Concern for the possibility of  DVT given significant edema in the left and incident of the wound for the past 6 weeks. Will ultrasound, if negative plan to add antibiotic coverage for sea-borne organism.    DVT study negative. Will discharged with Doxycycline for coverage and add this to his regime of Bactrim. Recommended elevation swelling and follow up with his PCP.                      Clinical Impression:   The primary encounter diagnosis was Ankle wound, left, initial encounter. A diagnosis of Pedal edema was also pertinent to this visit.      Disposition:   Disposition: Discharged  Condition: Stable                        April Raymond PA-C  09/29/18 2021

## 2019-09-27 ENCOUNTER — PATIENT MESSAGE (OUTPATIENT)
Dept: SLEEP MEDICINE | Facility: CLINIC | Age: 54
End: 2019-09-27

## 2019-10-01 ENCOUNTER — OFFICE VISIT (OUTPATIENT)
Dept: SLEEP MEDICINE | Facility: CLINIC | Age: 54
End: 2019-10-01
Payer: MEDICAID

## 2019-10-01 VITALS
SYSTOLIC BLOOD PRESSURE: 122 MMHG | DIASTOLIC BLOOD PRESSURE: 81 MMHG | BODY MASS INDEX: 31.14 KG/M2 | HEIGHT: 75 IN | WEIGHT: 250.44 LBS | HEART RATE: 67 BPM

## 2019-10-01 DIAGNOSIS — G47.33 OBSTRUCTIVE SLEEP APNEA: Primary | ICD-10-CM

## 2019-10-01 PROCEDURE — 99999 PR PBB SHADOW E&M-EST. PATIENT-LVL III: ICD-10-PCS | Mod: PBBFAC,,, | Performed by: NURSE PRACTITIONER

## 2019-10-01 PROCEDURE — 99999 PR PBB SHADOW E&M-EST. PATIENT-LVL III: CPT | Mod: PBBFAC,,, | Performed by: NURSE PRACTITIONER

## 2019-10-01 PROCEDURE — 99214 OFFICE O/P EST MOD 30 MIN: CPT | Mod: S$PBB,,, | Performed by: NURSE PRACTITIONER

## 2019-10-01 PROCEDURE — 99213 OFFICE O/P EST LOW 20 MIN: CPT | Mod: PBBFAC | Performed by: NURSE PRACTITIONER

## 2019-10-01 PROCEDURE — 99214 PR OFFICE/OUTPT VISIT, EST, LEVL IV, 30-39 MIN: ICD-10-PCS | Mod: S$PBB,,, | Performed by: NURSE PRACTITIONER

## 2019-10-01 RX ORDER — LITHIUM CARBONATE 300 MG/1
450 TABLET, FILM COATED, EXTENDED RELEASE ORAL DAILY
Refills: 1 | COMMUNITY
Start: 2019-08-23

## 2019-10-01 RX ORDER — QUETIAPINE FUMARATE 100 MG/1
TABLET, FILM COATED ORAL
COMMUNITY

## 2019-10-01 NOTE — PROGRESS NOTES
"This 54 y.o. year-old male returns for management of obstructive sleep apnea and CPAP equipment check.     10/01/2019 PHUONG Vasquez NP: Continues to use CPAP nightly, stating "I can't sleep without it". Bought battery pack for unit - pt mer sauceda, but purchased battery pack does not last through out the night when humidifier is used. Pt did not bring machine for interrogation. Denies troubles with unit, mask, or pressure. Heated tubing resolved oral drying.     CPAP Interrogation: did not bring machine     08/21/2018 PHUONG Vasquez NP: Since last clinic visit pt has completed CPAP titration study at [facility] on 06/11/2018 that showed optimal fixed CPAP 14 cm effectively resolved JEANNINE events. Copy of titration study reviewed today.   Returns today after set up of CPAP machine at [facility].     The patient symptomatically has excessive daytime sleepiness, excessive daytime fatigue, snoring, and interrupted sleep resolved with CPAP use.   Finds depression/mood better controlled with regular CPAP use.     Denies oral nasal drying with [employer].   Denies pressure intolerance, aerophagia, or congestion.     CPAP Interrogation: Resmed Airsense 10, CPAP 14 cm, Days Used: 26/30, > 4 hours: 23/30, Average Usage: 7.1 hours, Used Hours: 183.3, Leak 22 L/min, Predicted AHI: 1.5    On today's [place] Sleepiness Scale the patient scores a 9.    CPAP titration study @ [facility] 06/11/2018 CPAP 14 cm  Baseline Sleep Study: PSG/ SPLIT night study  In 2015 [facility].   showed significant JEANNINE with the AHI of 20/hour and SaO2 minimum of 96 %. Pressure 8 cm was advised    Review of Systems: Sleep related symptoms as per HPI. Otherwise, a balance of 10 systems reviewed is negative.    Physical Exam:   /81   Pulse 67   Ht 6' 3" (1.905 m)   Wt 113.6 kg (250 lb 7.1 oz)   BMI 31.30 kg/m²    GENERAL: Well groomed    Assessment:      Obstructive sleep apnea, moderate by AHI criteria,  with prior symptoms of excessive daytime " sleepiness, excessive daytime fatigue, snoring and interrupted sleep, now resolved with CPAP use. The patient is subjectively adherent on CPAP and experiencing symptomatic benefit. Medical co-mobidities: depression.    Plan:     Continue CPAP therapy at 14 cm H2O.  Supplies Rx updated. Provided Rx for EPAP as alternative during travel.     Education: During our discussion today, we talked about the etiology of obstructive sleep apnea as well as the potential ramifications of untreated sleep apnea, which could include daytime sleepiness, hypertension, heart disease and/or stroke. We discussed potential treatment options, which could include weight loss, body positioning, continuous positive airway pressure (CPAP), or referral for surgical consideration.     Behavior modification which includes losing weight, exercising, changing the sleep position, abstaining from alcohol, and avoiding certain medications    Precautions: The patient was advised to abstain from driving should they feel sleepy or drowsy    RTC in 12 months, sooner if needed.

## 2020-05-05 ENCOUNTER — LAB VISIT (OUTPATIENT)
Dept: LAB | Facility: HOSPITAL | Age: 55
End: 2020-05-05
Attending: FAMILY MEDICINE
Payer: MEDICAID

## 2020-05-05 DIAGNOSIS — U07.1 COVID-19 VIRUS DETECTED: Primary | ICD-10-CM

## 2020-05-05 LAB — SARS-COV-2 IGG SERPLBLD QL IA.RAPID: NEGATIVE

## 2020-05-05 PROCEDURE — 36415 COLL VENOUS BLD VENIPUNCTURE: CPT

## 2020-05-05 PROCEDURE — 86769 SARS-COV-2 COVID-19 ANTIBODY: CPT

## 2020-10-27 ENCOUNTER — OFFICE VISIT (OUTPATIENT)
Dept: SLEEP MEDICINE | Facility: CLINIC | Age: 55
End: 2020-10-27
Payer: MEDICAID

## 2020-10-27 VITALS
BODY MASS INDEX: 32.1 KG/M2 | WEIGHT: 256.81 LBS | SYSTOLIC BLOOD PRESSURE: 117 MMHG | DIASTOLIC BLOOD PRESSURE: 73 MMHG | HEART RATE: 68 BPM

## 2020-10-27 DIAGNOSIS — F31.81 BIPOLAR II DISORDER: ICD-10-CM

## 2020-10-27 DIAGNOSIS — G47.33 OBSTRUCTIVE SLEEP APNEA: Primary | ICD-10-CM

## 2020-10-27 PROCEDURE — 99999 PR PBB SHADOW E&M-EST. PATIENT-LVL III: ICD-10-PCS | Mod: PBBFAC,,, | Performed by: NURSE PRACTITIONER

## 2020-10-27 PROCEDURE — 99999 PR PBB SHADOW E&M-EST. PATIENT-LVL III: CPT | Mod: PBBFAC,,, | Performed by: NURSE PRACTITIONER

## 2020-10-27 PROCEDURE — 99213 OFFICE O/P EST LOW 20 MIN: CPT | Mod: PBBFAC | Performed by: NURSE PRACTITIONER

## 2020-10-27 PROCEDURE — 99214 PR OFFICE/OUTPT VISIT, EST, LEVL IV, 30-39 MIN: ICD-10-PCS | Mod: S$PBB,,, | Performed by: NURSE PRACTITIONER

## 2020-10-27 PROCEDURE — 99214 OFFICE O/P EST MOD 30 MIN: CPT | Mod: S$PBB,,, | Performed by: NURSE PRACTITIONER

## 2020-10-27 RX ORDER — LAMOTRIGINE 25 MG/1
25 TABLET ORAL DAILY
COMMUNITY

## 2020-10-27 RX ORDER — FLUOXETINE HYDROCHLORIDE 20 MG/1
40 CAPSULE ORAL DAILY
COMMUNITY

## 2020-10-27 NOTE — PROGRESS NOTES
This 55 y.o. year-old male returns for management of obstructive sleep apnea    He continues to use cpap 14cmqhs. Hasn't tried sleeping w/o it Has battery pack. Not tried EPAP yet. Getting regular supplies. Gained ~ 25# since using pap. Sees psychiatry for mood, on seroquel for sleep.  Pt did not bring machine for interrogation.     [employer] FFM.     CPAP titration study @ [facility] 06/11/2018 CPAP 14 cm  Baseline Sleep Study: PSG/ SPLIT night study  In 2015 [facility].   showed significant JEANNINE with the AHI of 20/hour and SaO2 minimum of 96 %. Pressure 8 cm was advised    Review of Systems: Sleep related symptoms as per HPI. Otherwise, a balance of 10 systems reviewed is negative.    Physical Exam:   /73   Pulse 68   Wt 116.5 kg (256 lb 13.4 oz)   BMI 32.10 kg/m²    GENERAL: Well groomed, obese, WD    Assessment:      Obstructive sleep apnea, moderate by AHI criteria, remains adherent on CPAP and experiencing symptomatic benefit  Bipolar II d/o    Plan:     Continue CPAP therapy at 14 cm H2O. REQUEST compliance report from [facility] updated. Provided Rx for EPAP as alternative during travel.     Education: During our discussion today, we talked about the etiology of obstructive sleep apnea as well as the potential ramifications of untreated sleep apnea, which could include daytime sleepiness, hypertension, heart disease and/or stroke  Encouraged weight loss efforts for potential improvement of JEANNINE and overall health benefits  discussed usefulness of CBT-I to help sleep w/o seroquel if interested, he willwork on wgt loss/eating habits  Continue to see psychiatry/continue meds  RTC in 12 months, sooner if needed.

## 2021-04-26 ENCOUNTER — PATIENT MESSAGE (OUTPATIENT)
Dept: RESEARCH | Facility: HOSPITAL | Age: 56
End: 2021-04-26

## 2022-06-11 NOTE — ED NOTES
NIBP STAT measurement started. PEC received in [facility]. Will actively seek placement when pt is medically cleared.

## 2022-09-16 ENCOUNTER — TELEPHONE (OUTPATIENT)
Dept: ORTHOPEDICS | Facility: CLINIC | Age: 57
End: 2022-09-16
Payer: MEDICAID

## 2022-09-16 DIAGNOSIS — M25.551 RIGHT HIP PAIN: Primary | ICD-10-CM

## 2022-09-19 ENCOUNTER — TELEPHONE (OUTPATIENT)
Dept: SPORTS MEDICINE | Facility: CLINIC | Age: 57
End: 2022-09-19
Payer: MEDICAID

## 2022-09-19 NOTE — TELEPHONE ENCOUNTER
Spoke with patient in notification that he would need to bring a copy of his MRI on a disc to be viewed at his scheduled appointment. He understood and agreed to acquire the disc.

## 2022-09-27 ENCOUNTER — OFFICE VISIT (OUTPATIENT)
Dept: SPORTS MEDICINE | Facility: CLINIC | Age: 57
End: 2022-09-27
Payer: MEDICAID

## 2022-09-27 VITALS — BODY MASS INDEX: 32.52 KG/M2 | HEIGHT: 75 IN | WEIGHT: 261.56 LBS

## 2022-09-27 DIAGNOSIS — S76.311A TEAR OF RIGHT HAMSTRING: Primary | ICD-10-CM

## 2022-09-27 PROCEDURE — 3008F BODY MASS INDEX DOCD: CPT | Mod: CPTII,,, | Performed by: STUDENT IN AN ORGANIZED HEALTH CARE EDUCATION/TRAINING PROGRAM

## 2022-09-27 PROCEDURE — 99999 PR PBB SHADOW E&M-EST. PATIENT-LVL III: CPT | Mod: PBBFAC,,, | Performed by: STUDENT IN AN ORGANIZED HEALTH CARE EDUCATION/TRAINING PROGRAM

## 2022-09-27 PROCEDURE — 1159F MED LIST DOCD IN RCRD: CPT | Mod: CPTII,,, | Performed by: STUDENT IN AN ORGANIZED HEALTH CARE EDUCATION/TRAINING PROGRAM

## 2022-09-27 PROCEDURE — 1160F PR REVIEW ALL MEDS BY PRESCRIBER/CLIN PHARMACIST DOCUMENTED: ICD-10-PCS | Mod: CPTII,,, | Performed by: STUDENT IN AN ORGANIZED HEALTH CARE EDUCATION/TRAINING PROGRAM

## 2022-09-27 PROCEDURE — 1159F PR MEDICATION LIST DOCUMENTED IN MEDICAL RECORD: ICD-10-PCS | Mod: CPTII,,, | Performed by: STUDENT IN AN ORGANIZED HEALTH CARE EDUCATION/TRAINING PROGRAM

## 2022-09-27 PROCEDURE — 99213 OFFICE O/P EST LOW 20 MIN: CPT | Mod: PBBFAC,PN | Performed by: STUDENT IN AN ORGANIZED HEALTH CARE EDUCATION/TRAINING PROGRAM

## 2022-09-27 PROCEDURE — 3008F PR BODY MASS INDEX (BMI) DOCUMENTED: ICD-10-PCS | Mod: CPTII,,, | Performed by: STUDENT IN AN ORGANIZED HEALTH CARE EDUCATION/TRAINING PROGRAM

## 2022-09-27 PROCEDURE — 99999 PR PBB SHADOW E&M-EST. PATIENT-LVL III: ICD-10-PCS | Mod: PBBFAC,,, | Performed by: STUDENT IN AN ORGANIZED HEALTH CARE EDUCATION/TRAINING PROGRAM

## 2022-09-27 PROCEDURE — 99204 PR OFFICE/OUTPT VISIT, NEW, LEVL IV, 45-59 MIN: ICD-10-PCS | Mod: S$PBB,,, | Performed by: STUDENT IN AN ORGANIZED HEALTH CARE EDUCATION/TRAINING PROGRAM

## 2022-09-27 PROCEDURE — 1160F RVW MEDS BY RX/DR IN RCRD: CPT | Mod: CPTII,,, | Performed by: STUDENT IN AN ORGANIZED HEALTH CARE EDUCATION/TRAINING PROGRAM

## 2022-09-27 PROCEDURE — 99204 OFFICE O/P NEW MOD 45 MIN: CPT | Mod: S$PBB,,, | Performed by: STUDENT IN AN ORGANIZED HEALTH CARE EDUCATION/TRAINING PROGRAM

## 2022-09-27 NOTE — PROGRESS NOTES
"Subjective:          Chief Complaint: Mannie Guerrero is a 57 y.o. male who had concerns including hamstring (Right ).    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 He states that about 2 weeks ago he suffered a slip that resulted in a hyper extension of the right knee. He states that he felt a pop in the proximal hamstring and immediate pain. He reports today with a completed MRI. He states that he is virtually pain free now. His pain has gradually decreased over the past 2 weeks. He denies any increased pain at night or difficulty sleeping.  Denies ever having ecchymoses to the leg or posterior thigh.  Denies any numbness or paresthesias or radicular pains to the right lower extremity.  Overall he feels like he has significantly improved.  He is doing most of his daily activities with no issues.  The only time he has made use of the sling and hard surfaces of the toilet seat for extended period of times.  This has been gradually improving though.    Past Medical History:   Diagnosis Date    Depression        Current Outpatient Medications on File Prior to Visit   Medication Sig Dispense Refill    FLUoxetine 20 MG capsule Take 40 mg by mouth once daily.      lamoTRIgine (LAMICTAL) 25 MG tablet Take 25 mg by mouth once daily.      lithium (LITHOBID) 300 MG CR tablet Take 450 mg by mouth once daily.   1    mupirocin (BACTROBAN) 2 % ointment Apply topically 3 (three) times daily.      QUEtiapine (SEROQUEL) 100 MG Tab 1 tablet      sulfamethoxazole-trimethoprim 800-160mg (BACTRIM DS) 800-160 mg Tab Take 1 tablet by mouth every 12 (twelve) hours.       No current facility-administered medications on file prior to visit.       Past Surgical History:   Procedure Laterality Date    HERNIA REPAIR         History reviewed. No pertinent family history.    Social History     Socioeconomic History    Marital status: Single   Tobacco Use    " Smoking status: Never    Smokeless tobacco: Never   Substance and Sexual Activity    Alcohol use: No     Alcohol/week: 0.0 standard drinks    Drug use: Yes     Types: Marijuana     Comment: occasionally    Sexual activity: Yes     Partners: Female       Review of Systems   Constitutional: Negative.   HENT: Negative.     Eyes: Negative.    Cardiovascular: Negative.    Respiratory: Negative.     Endocrine: Negative.    Hematologic/Lymphatic: Negative.    Skin: Negative.    Musculoskeletal:  Positive for myalgias. Negative for arthritis, back pain, falls, gout, joint pain, joint swelling, muscle cramps, muscle weakness, neck pain and stiffness.   Neurological: Negative.    Psychiatric/Behavioral: Negative.     Allergic/Immunologic: Negative.                  Objective:        General: Mannie is well-developed, well-nourished, appears stated age, in no acute distress, alert and oriented to time, place and person.     General    Nursing note and vitals reviewed.  Constitutional: He is oriented to person, place, and time. He appears well-developed and well-nourished. No distress.   HENT:   Head: Normocephalic and atraumatic.   Nose: Nose normal.   Eyes: EOM are normal.   Cardiovascular:  Intact distal pulses.            Pulmonary/Chest: Effort normal. No respiratory distress.   Neurological: He is alert and oriented to person, place, and time.   Psychiatric: He has a normal mood and affect. His behavior is normal. Judgment and thought content normal.     General Musculoskeletal Exam   Gait: normal       Right Knee Exam     Inspection   Alignment:  normal  Effusion: absent    Left Knee Exam     Inspection   Alignment:  normal  Effusion: absent    Right Hip Exam     Inspection   Scars: absent  Swelling: absent  Bruising: absent  No deformity of hip.  Quadriceps Atrophy:  Negative  Erythema: absent    Range of Motion   Abduction:  45   Adduction:  30   Extension:  0   Flexion:  120   External rotation:  70   Internal  rotation:  30     Tests   Pain w/ forced internal rotation (ODESSA): absent  Pain w/ forced external rotation (FADIR): absent  David: negative  Trendelenburg Test: negative  Circumduction test: negative  Stinchfield test: negative  Log Roll: negative    Other   Sensation: normal    Comments:  Prone exam:  Able to perform prone straight leg raise.  No ecchymoses.  Tender to ischium however this is mild.  90° knee flexion with 5/5 resistance, 30° knee flexion with 5-/5 resistance, this compared to 05/05 resistance on both sides to contralateral knee.  Left Hip Exam     Inspection   Scars: absent  Swelling: absent  No deformity of hip.  Quadriceps Atrophy:  negative  Erythema: absent  Bruising: absent    Range of Motion   Abduction:  45   Adduction:  30   Extension:  0   Flexion:  120   External rotation:  70   Internal rotation: 30     Tests   Pain w/ forced internal rotation (ODESSA): absent  Pain w/ forced external rotation (FADIR): absent  David: negative  Trendelenburg Test: negative  Circumduction test: negative  Stinchfield test: negative  Log Roll: negative    Other   Sensation: normal          Muscle Strength   Right Lower Extremity   Hip Abduction: 5/5   Hip Adduction: 5/5   Hip Flexion: 5/5   Ankle Dorsiflexion:  5/5   Left Lower Extremity   Hip Abduction: 5/5   Hip Adduction: 5/5   Hip Flexion: 5/5   Ankle Dorsiflexion:  5/5     Vascular Exam     Right Pulses  Dorsalis Pedis:      2+  Posterior Tibial:      2+        Left Pulses  Dorsalis Pedis:      2+  Posterior Tibial:      2+        Capillary Refill  Left Hand: normal capillary refill        Edema  Right Upper Leg: absent  Left Upper Leg: absent    Imaging:   X-rays of the right hip from 09/20/2022 personally viewed by me 09/27/2022.  These include AP hip, AP pelvis, and bilateral modified Diaz.  Joint space is maintained.  Large cam deformity bilaterally.      MRI from an outside institution from 09/14/2022 was uploaded and personally reviewed by me  09/27/2022.  There is complete tear of all 3 hamstring tendons off the proximal insertion of the ischium.  There is differential root levels a retraction.  The most I measured was about 5 cm.        Assessment:     Mannie Guerrero is a 57 y.o. male with a right proximal hamstring tear  Encounter Diagnosis   Name Primary?    Tear of right hamstring Yes          Plan:       The diagnosis and treatment options at length the patient all his questions were answered.  I discussed with him the MRI and the findings.  We discussed treatment options for proximal hamstring avulsion.  His case is rather complicated that he is completely asymptomatic with virtually no weakness.  He has continued to most of his activities with no issue.  I stated that by the book, he meets the criteria for operative proximal hamstring repair.  However clinically I do not think this is necessarily worth it.  We discussed a trial of non operative management with physical therapy to work on strengthening.  During this time he will try some his activities like to do such as biking and a paddle contact.  He will return to clinic in 3-4 weeks for re-evaluation.  He is able to perform all activities with no issues we will continue on this course.  If not we will further discuss operative intervention which would be operative proximal hamstring repair versus reconstruction.  I did explain to him that an extensive delay in operative intervention would increase the chance that he may need reconstruction with an allograft.  He voiced understanding.    All of their questions were answered.  They will call the clinic with any questions or concerns in the interim.    Should the patient's symptoms worsen, persist, or fail to improve they should return for reevaluation and I would be happy to see them back anytime.        Alexy Davidson M.D.    Please be aware that this note has been generated with the assistance of [employer] voice-to-text.  Please excuse any  spelling or grammatical errors.    Thank you for choosing Dr. Alexy Davidson for your sports medicine care. It is our goal to provide you with exceptional care that will help keep you healthy, active, and get you back in the game.     If you felt that you received exemplary care today, please consider leaving feedback for Dr. Davidson on [employer] at [email or web address].    Please do not hesitate to reach out to us via email, phone, or MyChart with any questions, concerns, or feedback.

## 2022-09-30 ENCOUNTER — CLINICAL SUPPORT (OUTPATIENT)
Dept: REHABILITATION | Facility: OTHER | Age: 57
End: 2022-09-30
Payer: MEDICAID

## 2022-09-30 DIAGNOSIS — M62.81 MUSCLE WEAKNESS OF LOWER EXTREMITY: ICD-10-CM

## 2022-09-30 DIAGNOSIS — S76.311A TEAR OF RIGHT HAMSTRING: ICD-10-CM

## 2022-09-30 DIAGNOSIS — M79.651 PAIN OF RIGHT THIGH: ICD-10-CM

## 2022-09-30 PROCEDURE — 97161 PT EVAL LOW COMPLEX 20 MIN: CPT | Mod: PN | Performed by: PHYSICAL THERAPIST

## 2022-09-30 PROCEDURE — 97110 THERAPEUTIC EXERCISES: CPT | Mod: PN | Performed by: PHYSICAL THERAPIST

## 2022-09-30 NOTE — PLAN OF CARE
[facility]   Physical Therapy Initial Evaluation     Date: 9/30/2022   Name: Mannie Steel Cesar  Clinic Number: [id number]    Therapy Diagnosis:   Encounter Diagnoses   Name Primary?    Tear of right hamstring     Pain of right thigh     Muscle weakness of lower extremity      Physician: Alexy Davidson MD    Physician Orders: PT Eval and Treat   Hamstring stretching Hamstring strengthening  Medical Diagnosis from Referral: S76.311A (ICD-10-CM) - Tear of right hamstring  Evaluation Date: 9/30/2022  Authorization Period Expiration: 9/27/2022  Plan of Care Expiration: 11/25/2022  Progress Note Due: 10/30/2022  Visit # / Visits authorized: 1/ 1   FOTO: 1/5    Precautions: Standard     Time In: 12:05 pm  Time Out: 1:00 pm  Total Appointment Time (timed & untimed codes): 55 minutes      SUBJECTIVE     Date of onset: 9/7/2022    History of current condition - Woody reports: Walking the dogs through the neighborhood and there had been construction in the area with muddy sidewalks. His R foot slipped and knee hyperextended; Felt a pop and fell to the ground.  He was able to walk back home and contacted MD next day. MRI showed a tear of R hamstring and seen by orthopedics. Pt has had minimal to no pain and able to walk on it. Pain when sitting on hard surfaces such as the toilet, but that has been resolved last 2 days. Feels more of a tightness like it's balled up near his sitz bone. Denies any numbness, tingling.     Falls: 9/7/2022    Imaging, MRI studies: (pre chat review from outside studies)    MRI of the right femur without contrast    INDICATION: Pain, injury one week ago    TECHNIQUE: Routine MRI of the right thigh was performed without contrast    COMPARISON: None    FINDINGS: There is full-thickness tear at the origin of the common extensor tendons involving both the semimembranosus, semitendinosus, and biceps femoris tendons. There is retraction of tendon by up to 9.5 cm. There is  associated is an fluid and hemorrhage along the myofascial planes to the mid distal thigh. The right gluteus minimus and medius tendons are intact. 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  Prior Level of Function: independent with ADL's, kayaking, fishing, biking, [place]  Current Level of Function: walking more slowly and cautiously since injury, difficulty sitting on hard surfaces    Pain:  Current 0/10, worst 3/10, best 0/10   Location: right thigh ; proximal hamstring  Description: tightness; discomfort  Aggravating Factors: sitting on a hard surface  Easing Factors: rest    Patients goals: To avoid surgery and be able to get into a fitness routine for weight loss; Has gained 30 lbs in past few years and lost muscle mass      Medical History:   Past Medical History:   Diagnosis Date    Depression        Surgical History:   Mannie Guerrero  has a past surgical history that includes Hernia repair.    Medications:   Mannie has a current medication list which includes the following prescription(s): fluoxetine, lamotrigine, lithium, mupirocin, quetiapine, and sulfamethoxazole-trimethoprim 800-160mg.    Allergies:   Review of patient's allergies indicates:  No Known Allergies       OBJECTIVE     Observation: gait unremarkable with equal stride length and stance time    MMT   Left  Right    Hip:  Flexion   5/5  5/5  Extension  5/5  4/5  Abduction  4+/5  4+/5  Adduction  5/5  5/5  External Rotation 5/5  4/5  Internal rotation 5/5  5/5    Knee:  Flexion   5/5  4+/5    Extension  5/5  5/5    Handheld dynamometer (MMT)- instructed within pain-free limits    Hamstrings in prone:   45 deg: L: 40.3 lbs, R: 23.3 lbs  90 deg: L: 24.2 lbs, R: 23.8 lbs     AROM/ PROM    Hip:   Flexion: L: 100, R: 106  Abduction: L:  "35 deg R: 30 deg  Extension: L: 12, R: 10  External Rotation L: 40, R: 40  Internal rotation L: 30, R: 30    Special Tests:  Timed single leg stance: L: 30 sec, R: 29 sec  5x sit to stand: 15 sec      Joint Mobility: R hip posterior glides 3-/6    Palpation: TTP medial to ischial tuberosity     Sensation: grossly intact BLE's    Flexibility:    Ely's test: R = 95 degrees ; L = 95 degrees   Popliteal Angle: R = -35 degrees ; L = -23 degrees         Limitation/Restriction for FOTO LE without knee Survey    Therapist reviewed FOTO scores for Mannie Steel Cesar on 9/30/2022.   FOTO documents entered into [employer] - see Media section.    Limitation Score: 39%         TREATMENT     Total Treatment time (time-based codes) separate from Evaluation: 25 minutes      Damián received the treatments listed below:      therapeutic exercises to develop strength, ROM, and flexibility for 15 minutes including:  Glute sets 5" x 20  Hamstring sets submaximal 5" x 10 R  S/L hip abd 2 x 10 ea  S/L clams GTB 2 x 10 ea  Supine active hamstring stretch 20" x 2 (pain free range)    manual therapy techniques: Soft tissue Mobilization were applied to the: R thigh for 10 minutes, including:  STM R hamstrings       PATIENT EDUCATION AND HOME EXERCISES     Education provided:   - HEP, role of PT, POC    Written Home Exercises Provided: yes. Exercises were reviewed and Damián was able to demonstrate them prior to the end of the session.  Damián demonstrated good  understanding of the education provided. See EMR under Patient Instructions for exercises provided during therapy sessions.    ASSESSMENT     Mannie is a 57 y.o. male referred to outpatient Physical Therapy with a medical diagnosis of R hamstring strain. Patient presents with R hamstring and B gluteal weakness, decreased flexibility, and decreased functional mobility. Condition limiting patient in participation of recreation including kayaking, biking, and walking >1 mile.     Patient " prognosis is Good.   Patient will benefit from skilled outpatient Physical Therapy to address the deficits stated above and in the chart below, provide patient /family education, and to maximize patientt's level of independence.     Plan of care discussed with patient: Yes  Patient's spiritual, cultural and educational needs considered and patient is agreeable to the plan of care and goals as stated below:     Anticipated Barriers for therapy: none    Medical Necessity is demonstrated by the following  History  Co-morbidities and personal factors that may impact the plan of care Co-morbidities:   none    Personal Factors:   no deficits     low   Examination  Body Structures and Functions, activity limitations and participation restrictions that may impact the plan of care Body Regions:   lower extremities    Body Systems:    ROM  strength    Participation Restrictions:   Walking, recreation    Activity limitations:   Learning and applying knowledge  no deficits    General Tasks and Commands  no deficits    Communication  no deficits    Mobility  walking    Self care  no deficits    Domestic Life  no deficits    Interactions/Relationships  no deficits    Life Areas  employment    Community and Social Life  recreation and leisure         high   Clinical Presentation stable and uncomplicated low   Decision Making/ Complexity Score: low     Goals:  Short Term Goals: 3 weeks   Patient to report pain with ADL's < or = 2/10 R hamstring  Patient to be able to bike 5 miles or greater for participation in recreational activities  Patient to have improved flexibility R hamstring as noted by 25 deg or less in 90/90 for ease with bending    Long Term Goals: 6 weeks   Patient to be independent with home exercise program for improved self management of condition   Patient to have decreased subjective report of disability as noted by a score of <30% on the FOTO LE questionnaire    Patient to have improved strength R hamstring by  10% or greater as noted by handheld dynamometer   Patient to be able to ambulate 1 mile with no difficulty for ease with vocational tasks    PLAN   Plan of care Certification: 9/30/2022 to 11/25/2022.    Outpatient Physical Therapy 2 times weekly for 6 weeks to include the following interventions: Gait Training, Manual Therapy, Moist Heat/ Ice, Neuromuscular Re-ed, Patient Education, Therapeutic Activities, Therapeutic Exercise, and dry needling prn .     Molly Walker, PT

## 2022-10-04 ENCOUNTER — DOCUMENTATION ONLY (OUTPATIENT)
Dept: REHABILITATION | Facility: OTHER | Age: 57
End: 2022-10-04

## 2022-10-04 ENCOUNTER — CLINICAL SUPPORT (OUTPATIENT)
Dept: REHABILITATION | Facility: OTHER | Age: 57
End: 2022-10-04
Payer: MEDICAID

## 2022-10-04 DIAGNOSIS — M79.651 PAIN OF RIGHT THIGH: Primary | ICD-10-CM

## 2022-10-04 DIAGNOSIS — M62.81 MUSCLE WEAKNESS OF LOWER EXTREMITY: ICD-10-CM

## 2022-10-04 PROCEDURE — 97110 THERAPEUTIC EXERCISES: CPT | Mod: PN,CQ

## 2022-10-04 NOTE — PROGRESS NOTES
PT/PTA met face to face to discuss pt's treatment plan and progress towards established goals. Pt will be seen by a physical therapist minimally every 6th visit or every 30 days.    Please see Updated Plan of Care dated 9/30/2022 for changes and updated goals.    Audie Knowles, PT

## 2022-10-06 ENCOUNTER — DOCUMENTATION ONLY (OUTPATIENT)
Dept: REHABILITATION | Facility: OTHER | Age: 57
End: 2022-10-06
Payer: MEDICAID

## 2022-10-07 ENCOUNTER — CLINICAL SUPPORT (OUTPATIENT)
Dept: REHABILITATION | Facility: OTHER | Age: 57
End: 2022-10-07
Payer: MEDICAID

## 2022-10-07 DIAGNOSIS — M79.651 PAIN OF RIGHT THIGH: Primary | ICD-10-CM

## 2022-10-07 DIAGNOSIS — M62.81 MUSCLE WEAKNESS OF LOWER EXTREMITY: ICD-10-CM

## 2022-10-07 PROCEDURE — 97110 THERAPEUTIC EXERCISES: CPT | Mod: PN,CQ

## 2022-10-07 NOTE — PROGRESS NOTES
"[facility]   Physical Therapy Treatment Note     Name: Mannie Guerrero  Clinic Number: [id number]    Therapy Diagnosis:   Encounter Diagnoses   Name Primary?    Pain of right thigh Yes    Muscle weakness of lower extremity        Physician: Alexy Davidson MD    Visit Date: 10/7/2022    Physician: Alexy Davidson MD     Physician Orders: PT Eval and Treat   Hamstring stretching Hamstring strengthening  Medical Diagnosis from Referral: S76.311A (ICD-10-CM) - Tear of right hamstring  Evaluation Date: 9/30/2022  Authorization Period Expiration: 12/24/2022  Plan of Care Expiration: 11/25/2022  Progress Note Due: 10/30/2022  Visit # / Visits authorized: 3/ 25   FOTO: 2/5 (9/30/2022)    Precautions: Standard    Time In: 9:12 am  Time Out: 9:58 am  Total Billable Time: 46 minutes    SUBJECTIVE     Pt reports: Didn't sleep well due to his friend's dog running off when he was going to watch it while the friend went out of town. They've been searching for it the last 24hrs. Pt states he hasn't felt any pain.     He  was somewhat  compliant with home exercise program.   Response to previous treatment: "It was fine"  Functional change: None    Pain: 0/10  Location:     OBJECTIVE     9/30/2022:  Observation: gait unremarkable with equal stride length and stance time     MMT                            Left                  Right     Hip:  Flexion                         5/5                   5/5  Extension                    5/5                   4/5  Abduction                    4+/5                 4+/5  Adduction                    5/5                   5/5  External Rotation        5/5                   4/5  Internal rotation           5/5                   5/5     Knee:  Flexion                         5/5                  4+/5                   Extension                    5/5                   5/5     Handheld dynamometer (MMT)- instructed within pain-free limits     Hamstrings in " "prone:   45 deg: L: 40.3 lbs, R: 23.3 lbs  90 deg: L: 24.2 lbs, R: 23.8 lbs     AROM/ PROM     Hip:   Flexion: L: 100, R: 106  Abduction: L: 35 deg R: 30 deg  Extension: L: 12, R: 10  External Rotation L: 40, R: 40  Internal rotation L: 30, R: 30     9/30/2022:  Special Tests:  Timed single leg stance: L: 30 sec, R: 29 sec  5x sit to stand: 15 sec     Joint Mobility: R hip posterior glides 3-/6     Palpation: TTP medial to ischial tuberosity      Sensation: grossly intact BLE's     Flexibility:               Ely's test: R = 95 degrees ; L = 95 degrees              Popliteal Angle: R = -35 degrees ; L = -23 degrees        Limitation/Restriction for FOTO LE without knee Survey     Therapist reviewed FOTO scores for Mannie Guerrero on 9/30/2022.   FOTO documents entered into [employer] - see Media section.     Limitation Score: 39%           TREATMENT     Damián received the bolded treatments listed below:      Patient received therapeutic exercises for 38 minutes for improved strength and AROM including:  Bike 5'  Glute sets 5" x 20  Hamstring sets submaximal 5" x 10 R  S/L hip abd 2 x 10 ea  S/L clams GTB 2 x 10 ea  Supine active hamstring stretch 20" x 2 (pain free range)  Shuttle R LE 3 x 10 2 black/1 red  Marches 2 x 10  Standing hip ext 2 x 10  Wall squats 3 x 20"  Heel raises 2 x 10  Toe raises 2 x 10  +Half lunge 2 x 40'    Patient received manual therapeutic technique for 8 minutes for improved soft tissue and joint mobility including:  STM R hamstrings        Patient received neuromuscular reeducation for 00 minutes for improved proprioception and balance including:      Patient received therapeutic activities for 00 minutes for improved tolerance to functional activities including:        PATIENT EDUCATION AND HOME EXERCISES     Home Exercises Provided and Patient Education Provided     Education provided:   -Educated pt on importance of compliance with their HEP   -Educated pt on proper exercise technique and " rationale     Written Home Exercises Provided: Patient instructed to cont prior HEP. Exercises were reviewed and Damián was able to demonstrate them prior to the end of the session.  Damián demonstrated good  understanding of the education provided. See EMR under Patient Instructions for exercises provided during therapy sessions    ASSESSMENT   Pt tolerated increase in weight and reps on shuttle, as well as addition of half lunges. Noted slight LOB with half lunges due to fatigue. Pt states he was able to do all therex without any pain.    Damián Is progressing well towards his goals.   Pt prognosis is Good.     Pt will continue to benefit from skilled outpatient physical therapy to address the deficits listed in the problem list box on initial evaluation, provide pt/family education and to maximize pt's level of independence in the home and community environment.     Pt's spiritual, cultural and educational needs considered and pt agreeable to plan of care and goals.     Anticipated barriers to physical therapy: None    Goals: Short Term Goals: 3 weeks   Patient to report pain with ADL's < or = 2/10 R hamstring  Patient to be able to bike 5 miles or greater for participation in recreational activities  Patient to have improved flexibility R hamstring as noted by 25 deg or less in 90/90 for ease with bending     Long Term Goals: 6 weeks   Patient to be independent with home exercise program for improved self management of condition   Patient to have decreased subjective report of disability as noted by a score of <30% on the FOTO LE questionnaire    Patient to have improved strength R hamstring by 10% or greater as noted by handheld dynamometer   Patient to be able to ambulate 1 mile with no difficulty for ease with vocational tasks    PLAN   Plan of care Certification: 9/30/2022 to 11/25/2022.     Continue to monitor pt tolerance to exercise and progress strengthening and flexibility of hamstrings and  hips    Outpatient Physical Therapy 2 times weekly for 6 weeks to include the following interventions: Gait Training, Manual Therapy, Moist Heat/ Ice, Neuromuscular Re-ed, Patient Education, Therapeutic Activities, Therapeutic Exercise, and dry needling prn .          Evan Lindsay III, PTA

## 2022-10-10 ENCOUNTER — CLINICAL SUPPORT (OUTPATIENT)
Dept: REHABILITATION | Facility: OTHER | Age: 57
End: 2022-10-10
Payer: MEDICAID

## 2022-10-10 DIAGNOSIS — M79.651 PAIN OF RIGHT THIGH: Primary | ICD-10-CM

## 2022-10-10 DIAGNOSIS — M62.81 MUSCLE WEAKNESS OF LOWER EXTREMITY: ICD-10-CM

## 2022-10-10 PROCEDURE — 97110 THERAPEUTIC EXERCISES: CPT | Mod: PN | Performed by: PHYSICAL THERAPIST

## 2022-10-10 NOTE — PROGRESS NOTES
"[facility]   Physical Therapy Treatment Note     Name: Mannie Guerrero  Clinic Number: [id number]    Therapy Diagnosis:   Encounter Diagnoses   Name Primary?    Pain of right thigh Yes    Muscle weakness of lower extremity        Physician: Alexy Davidson MD    Visit Date: 10/10/2022    Physician: Alexy Davidson MD     Physician Orders: PT Eval and Treat   Hamstring stretching Hamstring strengthening  Medical Diagnosis from Referral: S76.311A (ICD-10-CM) - Tear of right hamstring  Evaluation Date: 9/30/2022  Authorization Period Expiration: 12/24/2022  Plan of Care Expiration: 11/25/2022  Progress Note Due: 10/30/2022  Visit # / Visits authorized: 3/ 25   FOTO: 2/5 (9/30/2022)    Precautions: Standard    Time In: 10:05 am  Time Out: 11:00 am  Total Billable Time: 55 minutes    SUBJECTIVE     Pt reports: Pain and tightness with sitting gone. He was able to ride his bike to PT without difficulty. Has been doing some lifting without aggravation of pain.     He  was somewhat  compliant with home exercise program.   Response to previous treatment: "It was fine"  Functional change: None    Pain: 0/10  Location: R hamstring     OBJECTIVE     10/10/2022     Flexibility:                        Popliteal Angle: R = -12 degrees        Limitation/Restriction for FOTO LE without knee Survey     Therapist reviewed FOTO scores for Mannie Guerrero on 9/30/2022.   FOTO documents entered into [employer] - see Media section.     Limitation Score: 39%           TREATMENT     Damián received the bolded treatments listed below:      Patient received therapeutic exercises for 40 minutes for improved strength and AROM including:  Bike 5'  Glute sets 5" x 20  Hamstring sets submaximal 5" x 10 R  S/L hip abd 3 x 10 ea, 2# - lateral banded walks NV  S/L clams BTB 3 x 10 ea  Supine active hamstring stretch 20" x 3 (pain free range)  Shuttle R LE 3 x 10 2 black/1 red  Marches 2 x 10  Prone hip ext 3 x " "10  Wall squats 3 x 20"  Heel raises 2 x 10  Toe raises 2 x 10  Half lunge 2 x 40'  +Prone knee flexion/ hamstring curls 2# 3 x 10  +Bridging (DL) with BTB 3 x 10  +LAQ 5# 3 x 10    Patient received manual therapeutic technique for 15 minutes for improved soft tissue and joint mobility including:  STM R hamstrings      Patient received neuromuscular reeducation for 00 minutes for improved proprioception and balance including:      Patient received therapeutic activities for 00 minutes for improved tolerance to functional activities including:        PATIENT EDUCATION AND HOME EXERCISES     Home Exercises Provided and Patient Education Provided     Education provided:   -Educated pt on continued HEP and activity per tolerance     Written Home Exercises Provided: Patient instructed to cont prior HEP. Exercises were reviewed and Damián was able to demonstrate them prior to the end of the session.  Damián demonstrated good  understanding of the education provided. See EMR under Patient Instructions for exercises provided during therapy sessions    ASSESSMENT   Pt able to progress increased resistance and repetitions with mat exercises this session without reports of fatigue or pain. Patient progressing with decreased pain with activity and hamstring flexibility. Will progress functional activities off mat next visit per tolerance.     Damián Is progressing well towards his goals.   Pt prognosis is Good.     Pt will continue to benefit from skilled outpatient physical therapy to address the deficits listed in the problem list box on initial evaluation, provide pt/family education and to maximize pt's level of independence in the home and community environment.     Pt's spiritual, cultural and educational needs considered and pt agreeable to plan of care and goals.     Anticipated barriers to physical therapy: None    Goals: Short Term Goals: 3 weeks   Patient to report pain with ADL's < or = 2/10 R hamstring (met " 10/10/2022)  Patient to be able to bike 5 miles or greater for participation in recreational activities (in progress)  Patient to have improved flexibility R hamstring as noted by 25 deg or less in 90/90 for ease with bending (met 10/10/2022)     Long Term Goals: 6 weeks   Patient to be independent with home exercise program for improved self management of condition   Patient to have decreased subjective report of disability as noted by a score of <30% on the FOTO LE questionnaire    Patient to have improved strength R hamstring by 10% or greater as noted by handheld dynamometer   Patient to be able to ambulate 1 mile with no difficulty for ease with vocational tasks    PLAN   Plan of care Certification: 9/30/2022 to 11/25/2022.     Continue to monitor pt tolerance to exercise and progress strengthening of hamstrings and hips      Molly Walker, PT

## 2022-10-14 ENCOUNTER — CLINICAL SUPPORT (OUTPATIENT)
Dept: REHABILITATION | Facility: OTHER | Age: 57
End: 2022-10-14
Payer: MEDICAID

## 2022-10-14 DIAGNOSIS — M62.81 MUSCLE WEAKNESS OF LOWER EXTREMITY: ICD-10-CM

## 2022-10-14 DIAGNOSIS — M79.651 PAIN OF RIGHT THIGH: Primary | ICD-10-CM

## 2022-10-14 PROCEDURE — 97110 THERAPEUTIC EXERCISES: CPT | Mod: PN,CQ

## 2022-10-14 NOTE — PROGRESS NOTES
"[facility]   Physical Therapy Treatment Note     Name: Mannie Guerrero  Clinic Number: [id number]    Therapy Diagnosis:   Encounter Diagnoses   Name Primary?    Pain of right thigh Yes    Muscle weakness of lower extremity          Physician: Alexy Davdison MD    Visit Date: 10/14/2022    Physician: Alexy Davidson MD     Physician Orders: PT Eval and Treat   Hamstring stretching Hamstring strengthening  Medical Diagnosis from Referral: S76.311A (ICD-10-CM) - Tear of right hamstring  Evaluation Date: 9/30/2022  Authorization Period Expiration: 12/24/2022  Plan of Care Expiration: 11/25/2022  Progress Note Due: 10/30/2022  Visit # / Visits authorized: 4/ 25   FOTO: 2/5 (9/30/2022)    Precautions: Standard    Time In: 1:05 pm  Time Out: 1:50 pm  Total Billable Time: 45 minutes    SUBJECTIVE     Pt reports: He rode his bike to therapy today without any increased pain. No complaints of pain with activities or being on his feet since last visit.    He  was somewhat  compliant with home exercise program.   Response to previous treatment: "It was fine"  Functional change: None    Pain: 0/10  Location: R hamstring     OBJECTIVE     10/10/2022     Flexibility:                        Popliteal Angle: R = -12 degrees        Limitation/Restriction for FOTO LE without knee Survey     Therapist reviewed FOTO scores for Mannie Guerrero on 9/30/2022.   FOTO documents entered into [employer] - see Media section.     Limitation Score: 39%           TREATMENT     Damián received the bolded treatments listed below:      Patient received therapeutic exercises for 40 minutes for improved strength and AROM including:  Bike 5'  Glute sets 5" x 20  Hamstring sets submaximal 5" x 10 R  lateral banded walks BTB 40 ft x2 laps  +Monster walks BTB 2 laps 40 ft  S/L clams BTB 3 x 10 ea  Supine active hamstring stretch 20" x 3 (pain free range)  Shuttle R LE 3 x 10 2 black/1 red  Marches 2 x 10  Prone hip ext 3 x " "10  Wall squats 3 x 20"  Heel raises 2 x 10  Toe raises 2 x 10  Half lunge 2 x 40'  Standing knee flexion/ hamstring curls 3# 3 x 10  Bridging (DL) with BTB 3 x 10  LAQ 5# 3 x 10  +Mini squats in // bars with B HRA 2x10     Patient received manual therapeutic technique for 15 minutes for improved soft tissue and joint mobility including:   STM R hamstrings      Patient received neuromuscular reeducation for 00 minutes for improved proprioception and balance including:      Patient received therapeutic activities for 00 minutes for improved tolerance to functional activities including:        PATIENT EDUCATION AND HOME EXERCISES     Home Exercises Provided and Patient Education Provided     Education provided:   -Educated pt on continued HEP and activity per tolerance     Written Home Exercises Provided: Patient instructed to cont prior HEP. Exercises were reviewed and Damián was able to demonstrate them prior to the end of the session.  Damián demonstrated good  understanding of the education provided. See EMR under Patient Instructions for exercises provided during therapy sessions    ASSESSMENT   Patient tolerated treatment well today with no increased hamstring pain. Progressed LE strengthening today with mini squats in bars, and lat walks/monster walks today. Provided patient physical cuing with chair during squats to facilitate proper form. Patient continues to progress with decreased pain with activity and hamstring flexibility. Continue to progress LE strengthening exercises as tolerated per patient.    Damián Is progressing well towards his goals.   Pt prognosis is Good.     Pt will continue to benefit from skilled outpatient physical therapy to address the deficits listed in the problem list box on initial evaluation, provide pt/family education and to maximize pt's level of independence in the home and community environment.     Pt's spiritual, cultural and educational needs considered and pt agreeable to " plan of care and goals.     Anticipated barriers to physical therapy: None    Goals: Short Term Goals: 3 weeks   Patient to report pain with ADL's < or = 2/10 R hamstring (met 10/10/2022)  Patient to be able to bike 5 miles or greater for participation in recreational activities (in progress)  Patient to have improved flexibility R hamstring as noted by 25 deg or less in 90/90 for ease with bending (met 10/10/2022)     Long Term Goals: 6 weeks   Patient to be independent with home exercise program for improved self management of condition   Patient to have decreased subjective report of disability as noted by a score of <30% on the FOTO LE questionnaire    Patient to have improved strength R hamstring by 10% or greater as noted by handheld dynamometer   Patient to be able to ambulate 1 mile with no difficulty for ease with vocational tasks    PLAN   Plan of care Certification: 9/30/2022 to 11/25/2022.     Continue to monitor pt tolerance to exercise and progress strengthening of hamstrings and hips      Hector Ho, PTA

## 2022-10-18 ENCOUNTER — OFFICE VISIT (OUTPATIENT)
Dept: SPORTS MEDICINE | Facility: CLINIC | Age: 57
End: 2022-10-18
Payer: MEDICAID

## 2022-10-18 VITALS — HEIGHT: 75 IN | BODY MASS INDEX: 32.58 KG/M2 | WEIGHT: 262 LBS

## 2022-10-18 DIAGNOSIS — S76.311A TEAR OF RIGHT HAMSTRING: Primary | ICD-10-CM

## 2022-10-18 PROCEDURE — 1159F MED LIST DOCD IN RCRD: CPT | Mod: CPTII,,, | Performed by: STUDENT IN AN ORGANIZED HEALTH CARE EDUCATION/TRAINING PROGRAM

## 2022-10-18 PROCEDURE — 99213 OFFICE O/P EST LOW 20 MIN: CPT | Mod: PBBFAC,PN | Performed by: STUDENT IN AN ORGANIZED HEALTH CARE EDUCATION/TRAINING PROGRAM

## 2022-10-18 PROCEDURE — 99213 OFFICE O/P EST LOW 20 MIN: CPT | Mod: S$PBB,,, | Performed by: STUDENT IN AN ORGANIZED HEALTH CARE EDUCATION/TRAINING PROGRAM

## 2022-10-18 PROCEDURE — 99213 PR OFFICE/OUTPT VISIT, EST, LEVL III, 20-29 MIN: ICD-10-PCS | Mod: S$PBB,,, | Performed by: STUDENT IN AN ORGANIZED HEALTH CARE EDUCATION/TRAINING PROGRAM

## 2022-10-18 PROCEDURE — 1160F RVW MEDS BY RX/DR IN RCRD: CPT | Mod: CPTII,,, | Performed by: STUDENT IN AN ORGANIZED HEALTH CARE EDUCATION/TRAINING PROGRAM

## 2022-10-18 PROCEDURE — 99999 PR PBB SHADOW E&M-EST. PATIENT-LVL III: CPT | Mod: PBBFAC,,, | Performed by: STUDENT IN AN ORGANIZED HEALTH CARE EDUCATION/TRAINING PROGRAM

## 2022-10-18 PROCEDURE — 99999 PR PBB SHADOW E&M-EST. PATIENT-LVL III: ICD-10-PCS | Mod: PBBFAC,,, | Performed by: STUDENT IN AN ORGANIZED HEALTH CARE EDUCATION/TRAINING PROGRAM

## 2022-10-18 PROCEDURE — 1160F PR REVIEW ALL MEDS BY PRESCRIBER/CLIN PHARMACIST DOCUMENTED: ICD-10-PCS | Mod: CPTII,,, | Performed by: STUDENT IN AN ORGANIZED HEALTH CARE EDUCATION/TRAINING PROGRAM

## 2022-10-18 PROCEDURE — 1159F PR MEDICATION LIST DOCUMENTED IN MEDICAL RECORD: ICD-10-PCS | Mod: CPTII,,, | Performed by: STUDENT IN AN ORGANIZED HEALTH CARE EDUCATION/TRAINING PROGRAM

## 2022-10-18 NOTE — PROGRESS NOTES
"Subjective:          Chief Complaint: Mannie Guerrero is a 57 y.o. male who had concerns including hamstring (Right).    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 He states that he has been in physical therapy at the [facility] location 2x week. He feels that he has made great progress and is pain free at this time. He feels that he can perform all of his daily activities at this time.  He has no limitations from this.  He is pleased with the results he has had physical therapy.    Past Medical History:   Diagnosis Date    Depression        Current Outpatient Medications on File Prior to Visit   Medication Sig Dispense Refill    FLUoxetine 20 MG capsule Take 40 mg by mouth once daily.      lamoTRIgine (LAMICTAL) 25 MG tablet Take 25 mg by mouth once daily.      lithium (LITHOBID) 300 MG CR tablet Take 450 mg by mouth once daily.   1    mupirocin (BACTROBAN) 2 % ointment Apply topically 3 (three) times daily.      QUEtiapine (SEROQUEL) 100 MG Tab 1 tablet      sulfamethoxazole-trimethoprim 800-160mg (BACTRIM DS) 800-160 mg Tab Take 1 tablet by mouth every 12 (twelve) hours.       No current facility-administered medications on file prior to visit.       Past Surgical History:   Procedure Laterality Date    HERNIA REPAIR         History reviewed. No pertinent family history.    Social History     Socioeconomic History    Marital status: Single   Tobacco Use    Smoking status: Never    Smokeless tobacco: Never   Substance and Sexual Activity    Alcohol use: No     Alcohol/week: 0.0 standard drinks    Drug use: Yes     Types: Marijuana     Comment: occasionally    Sexual activity: Yes     Partners: Female       Review of Systems   Constitutional: Negative.   HENT: Negative.     Eyes: Negative.    Cardiovascular: Negative.    Respiratory: Negative.     Endocrine: Negative.    Hematologic/Lymphatic: Negative.    Skin: " Negative.    Musculoskeletal:  Negative for arthritis, back pain, falls, gout, joint pain, joint swelling, muscle cramps, muscle weakness, myalgias, neck pain and stiffness.   Neurological: Negative.    Psychiatric/Behavioral: Negative.     Allergic/Immunologic: Negative.                  Objective:        General: Mannie is well-developed, well-nourished, appears stated age, in no acute distress, alert and oriented to time, place and person.     General    Nursing note and vitals reviewed.  Constitutional: He is oriented to person, place, and time. He appears well-developed and well-nourished. No distress.   HENT:   Head: Normocephalic and atraumatic.   Nose: Nose normal.   Eyes: EOM are normal.   Cardiovascular:  Intact distal pulses.            Pulmonary/Chest: Effort normal. No respiratory distress.   Neurological: He is alert and oriented to person, place, and time.   Psychiatric: He has a normal mood and affect. His behavior is normal. Judgment and thought content normal.     General Musculoskeletal Exam   Gait: normal       Right Knee Exam     Inspection   Alignment:  normal  Effusion: absent    Left Knee Exam     Inspection   Alignment:  normal  Effusion: absent    Right Hip Exam     Inspection   Scars: absent  Swelling: absent  Bruising: absent  No deformity of hip.  Quadriceps Atrophy:  Negative  Erythema: absent    Range of Motion   Abduction:  45   Adduction:  30   Extension:  0   Flexion:  120   External rotation:  70   Internal rotation:  30     Tests   Pain w/ forced internal rotation (ODESSA): absent  Pain w/ forced external rotation (FADIR): absent  David: negative  Trendelenburg Test: negative  Circumduction test: negative  Stinchfield test: negative  Log Roll: negative    Other   Sensation: normal    Comments:  Prone exam:  Able to perform prone straight leg raise.  No ecchymoses.    90° knee flexion with 5/5 resistance, 30° knee flexion with 5/5 resistance  Left Hip Exam     Inspection   Scars:  absent  Swelling: absent  No deformity of hip.  Quadriceps Atrophy:  negative  Erythema: absent  Bruising: absent    Range of Motion   Abduction:  45   Adduction:  30   Extension:  0   Flexion:  120   External rotation:  70   Internal rotation: 30     Tests   Pain w/ forced internal rotation (ODESSA): absent  Pain w/ forced external rotation (FADIR): absent  David: negative  Trendelenburg Test: negative  Circumduction test: negative  Stinchfield test: negative  Log Roll: negative    Other   Sensation: normal          Muscle Strength   Right Lower Extremity   Hip Abduction: 5/5   Hip Adduction: 5/5   Hip Flexion: 5/5   Ankle Dorsiflexion:  5/5   Left Lower Extremity   Hip Abduction: 5/5   Hip Adduction: 5/5   Hip Flexion: 5/5   Ankle Dorsiflexion:  5/5     Vascular Exam     Right Pulses  Dorsalis Pedis:      2+  Posterior Tibial:      2+        Left Pulses  Dorsalis Pedis:      2+  Posterior Tibial:      2+        Capillary Refill  Left Hand: normal capillary refill        Edema  Right Upper Leg: absent  Left Upper Leg: absent    Imaging:   X-rays of the right hip from 09/20/2022 personally viewed by me 09/27/2022.  These include AP hip, AP pelvis, and bilateral modified Diaz.  Joint space is maintained.  Large cam deformity bilaterally.      MRI from an outside institution from 09/14/2022 was uploaded and personally reviewed by me 09/27/2022.  There is complete tear of all 3 hamstring tendons off the proximal insertion of the ischium.  There is differential root levels a retraction.  The most I measured was about 2.5 cm.        Assessment:     Mannie Guerrero is a 57 y.o. male with a right proximal hamstring tear  Encounter Diagnosis   Name Primary?    Tear of right hamstring Yes            Plan:     He has done very well with physical therapy.  We will continue this.  He can do activities as tolerated.  Return to clinic in 2-3 months for repeat evaluation.      All of their questions were answered.  They will  call the clinic with any questions or concerns in the interim.    Should the patient's symptoms worsen, persist, or fail to improve they should return for reevaluation and I would be happy to see them back anytime.        Alexy Davidson M.D.    Please be aware that this note has been generated with the assistance of [employer] voice-to-text.  Please excuse any spelling or grammatical errors.    Thank you for choosing Dr. Alexy Davidson for your sports medicine care. It is our goal to provide you with exceptional care that will help keep you healthy, active, and get you back in the game.     If you felt that you received exemplary care today, please consider leaving feedback for Dr. Davidson on [employer] at [email or web address].    Please do not hesitate to reach out to us via email, phone, or MyChart with any questions, concerns, or feedback.

## 2022-10-19 ENCOUNTER — CLINICAL SUPPORT (OUTPATIENT)
Dept: REHABILITATION | Facility: OTHER | Age: 57
End: 2022-10-19
Payer: MEDICAID

## 2022-10-19 DIAGNOSIS — S76.311A TEAR OF RIGHT HAMSTRING: ICD-10-CM

## 2022-10-19 DIAGNOSIS — M79.651 PAIN OF RIGHT THIGH: Primary | ICD-10-CM

## 2022-10-19 DIAGNOSIS — M62.81 MUSCLE WEAKNESS OF LOWER EXTREMITY: ICD-10-CM

## 2022-10-19 PROCEDURE — 97110 THERAPEUTIC EXERCISES: CPT | Mod: PN | Performed by: PHYSICAL THERAPIST
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
## 2022-11-01 ENCOUNTER — CLINICAL SUPPORT (OUTPATIENT)
Dept: REHABILITATION | Facility: OTHER | Age: 57
End: 2022-11-01
Payer: MEDICAID

## 2022-11-01 DIAGNOSIS — M62.81 MUSCLE WEAKNESS OF LOWER EXTREMITY: ICD-10-CM

## 2022-11-01 DIAGNOSIS — M79.651 PAIN OF RIGHT THIGH: Primary | ICD-10-CM

## 2022-11-01 PROCEDURE — 97110 THERAPEUTIC EXERCISES: CPT | Mod: PN,CQ

## 2022-11-01 NOTE — PROGRESS NOTES
"[facility]   Physical Therapy Treatment Note     Name: Mannie Guerrero  Clinic Number: [id number]    Therapy Diagnosis:   Encounter Diagnoses   Name Primary?    Pain of right thigh Yes    Muscle weakness of lower extremity        Physician: Alexy Davidson MD    Visit Date: 11/1/2022    Physician: Alexy Davidson MD     Physician Orders: PT Eval and Treat   Hamstring stretching Hamstring strengthening  Medical Diagnosis from Referral: S76.311A (ICD-10-CM) - Tear of right hamstring  Evaluation Date: 9/30/2022  Authorization Period Expiration: 12/24/2022  Plan of Care Expiration: 11/25/2022  Progress Note Due: 10/30/2022  Visit # / Visits authorized: 5/ 25   FOTO: 3/5 (9/30/2022)  PTA Visit #: 1/ 5    Precautions: Standard    Time In: 12:17 pm  Time Out: 12:55 pm  Total Billable Time: 38 minutes    SUBJECTIVE   Pt reports: Back is feeling a little stiff from standing and cooking all weekend, but feeling good otherwise    He  was somewhat  compliant with home exercise program.   Response to previous treatment: "Felt good"  Functional change: None    Pain: 0/10  Location: R hamstring     OBJECTIVE     10/10/2022     Flexibility:                        Popliteal Angle: R = -12 degrees        Limitation/Restriction for FOTO LE without knee Survey     Therapist reviewed FOTO scores for Mannie Guerrero on 9/30/2022.   FOTO documents entered into [employer] - see Media section.     Limitation Score: 39%           TREATMENT     Damián received the bolded treatments listed below:      Patient received therapeutic exercises for 38 minutes for improved strength and AROM including:  Half lunge 2 x 40'  Standing knee flexion/ hamstring curls 3# 3 x 10  Bridging (DL) with BTB 3 x 10  LAQ 5# 3 x 10  chair squats with airex 2x10   R Heel raises 2 x 10  Prone hip ext 3 x 10 (off edge of table)  Supine active hamstring stretch 20" x 3 (pain free range)  Shuttle R LE 3 x 15 2 black/1 red  lateral banded " "walks BTB 40 ft x2 laps  Monster walks BTB 2 laps 40 ft    Bike 0'  Glute sets 5" x 20  Hamstring sets submaximal 5" x 10 R  S/L clams BTB 2 x 15 ea  Marches 2 x 10  Wall squats 3 x 20"  Toe raises 2 x 10      Patient received manual therapeutic technique for 00 minutes for improved soft tissue and joint mobility including:   STM R hamstrings      Patient received neuromuscular reeducation for 00 minutes for improved proprioception and balance including:      Patient received therapeutic activities for 00 minutes for improved tolerance to functional activities including:        PATIENT EDUCATION AND HOME EXERCISES     Home Exercises Provided and Patient Education Provided     Education provided:   -Educated pt on continued HEP and activity per tolerance     Written Home Exercises Provided: Patient instructed to cont prior HEP. Exercises were reviewed and Damián was able to demonstrate them prior to the end of the session.  Damián demonstrated good  understanding of the education provided. See EMR under Patient Instructions for exercises provided during therapy sessions    ASSESSMENT   Pt tolerated treatment well with no provocation of hamstring pain. Pt with good training effect and muscle fatigue this treatment.    Damián Is progressing well towards his goals.   Pt prognosis is Good.     Pt will continue to benefit from skilled outpatient physical therapy to address the deficits listed in the problem list box on initial evaluation, provide pt/family education and to maximize pt's level of independence in the home and community environment.     Pt's spiritual, cultural and educational needs considered and pt agreeable to plan of care and goals.     Anticipated barriers to physical therapy: None    Goals: Short Term Goals: 3 weeks   Patient to report pain with ADL's < or = 2/10 R hamstring (met 10/10/2022)  Patient to be able to bike 5 miles or greater for participation in recreational activities (in progress)  Patient " to have improved flexibility R hamstring as noted by 25 deg or less in 90/90 for ease with bending (met 10/10/2022)     Long Term Goals: 6 weeks   Patient to be independent with home exercise program for improved self management of condition   Patient to have decreased subjective report of disability as noted by a score of <30% on the FOTO LE questionnaire    Patient to have improved strength R hamstring by 10% or greater as noted by handheld dynamometer   Patient to be able to ambulate 1 mile with no difficulty for ease with vocational tasks    PLAN   Plan of care Certification: 9/30/2022 to 11/25/2022.     Continue to monitor pt tolerance to exercise and progress strengthening of hamstrings and hips      Evan Lindsay III, PTA

## 2022-11-08 ENCOUNTER — CLINICAL SUPPORT (OUTPATIENT)
Dept: REHABILITATION | Facility: OTHER | Age: 57
End: 2022-11-08
Payer: MEDICAID

## 2022-11-08 DIAGNOSIS — M79.651 PAIN OF RIGHT THIGH: Primary | ICD-10-CM

## 2022-11-08 DIAGNOSIS — M62.81 MUSCLE WEAKNESS OF LOWER EXTREMITY: ICD-10-CM

## 2022-11-08 PROCEDURE — 97110 THERAPEUTIC EXERCISES: CPT | Mod: PN,CQ

## 2022-11-08 NOTE — PROGRESS NOTES
"[facility]   Physical Therapy Treatment Note     Name: Mannie Guerrero  Clinic Number: [id number]    Therapy Diagnosis:   Encounter Diagnoses   Name Primary?    Pain of right thigh Yes    Muscle weakness of lower extremity        Physician: Alexy Davidson MD    Visit Date: 11/8/2022    Physician: Alexy Davidson MD     Physician Orders: PT Eval and Treat   Hamstring stretching Hamstring strengthening  Medical Diagnosis from Referral: S76.311A (ICD-10-CM) - Tear of right hamstring  Evaluation Date: 9/30/2022  Authorization Period Expiration: 12/24/2022  Plan of Care Expiration: 11/25/2022  Progress Note Due: 10/30/2022  Visit # / Visits authorized: 6/ 25   FOTO: 4/5 (9/30/2022)  PTA Visit #: 2/ 5    Precautions: Standard    Time In: 1:09 pm  Time Out: 1:50 pm  Total Billable Time: 41 minutes    SUBJECTIVE   Pt reports: Hurt his L wrist over the weekend, which made riding his bike here uncomfortable. Wrist is swollen.    He  was somewhat  compliant with home exercise program.   Response to previous treatment: "Pretty good"  Functional change: None    Pain: 0/10 hamstring ; 5/10 wrist  Location: R hamstring     OBJECTIVE     10/10/2022     Flexibility:                        Popliteal Angle: R = -12 degrees        Limitation/Restriction for FOTO LE without knee Survey     Therapist reviewed FOTO scores for Mannie Guerrero on 9/30/2022.   FOTO documents entered into [employer] - see Media section.     Limitation Score: 39%           TREATMENT     Damián received the bolded treatments listed below:      Patient received therapeutic exercises for 41 minutes for improved strength and AROM including:  Half lunge 2 x 40'  Standing knee flexion/ hamstring curls 3# 3 x 10  Bridging (DL) with BTB 3 x 10  LAQ 3 x 10  5#  chair squats with airex 2x10   R Heel raises 2 x 10  Prone hip ext 3 x 10 (off edge of table)  Supine active hamstring stretch 20" x 3 (pain free range)  Shuttle R LE 3 x 15 2 " "black/1 red  lateral banded walks BTB 40 ft x2 laps  Monster walks BTB 2 laps 40 ft    Bike 0'  Glute sets 5" x 20  Hamstring sets submaximal 5" x 10 R  S/L clams BTB 2 x 15 ea  Marches 2 x 10  Wall squats 3 x 20"  Toe raises 2 x 10      Patient received manual therapeutic technique for 00 minutes for improved soft tissue and joint mobility including:   STM R hamstrings      Patient received neuromuscular reeducation for 00 minutes for improved proprioception and balance including:      Patient received therapeutic activities for 00 minutes for improved tolerance to functional activities including:        PATIENT EDUCATION AND HOME EXERCISES     Home Exercises Provided and Patient Education Provided     Education provided:   -Educated pt on continued HEP and activity per tolerance     Written Home Exercises Provided: Patient instructed to cont prior HEP. Exercises were reviewed and Damián was able to demonstrate them prior to the end of the session.  Damián demonstrated good  understanding of the education provided. See EMR under Patient Instructions for exercises provided during therapy sessions    ASSESSMENT   Pt tolerated treatment well with no adverse effects. Pt continues with good training effect and muscle fatigue, stating his legs feel "shaky".     Damián Is progressing well towards his goals.   Pt prognosis is Good.     Pt will continue to benefit from skilled outpatient physical therapy to address the deficits listed in the problem list box on initial evaluation, provide pt/family education and to maximize pt's level of independence in the home and community environment.     Pt's spiritual, cultural and educational needs considered and pt agreeable to plan of care and goals.     Anticipated barriers to physical therapy: None    Goals: Short Term Goals: 3 weeks   Patient to report pain with ADL's < or = 2/10 R hamstring (met 10/10/2022)  Patient to be able to bike 5 miles or greater for participation in " recreational activities (in progress)  Patient to have improved flexibility R hamstring as noted by 25 deg or less in 90/90 for ease with bending (met 10/10/2022)     Long Term Goals: 6 weeks   Patient to be independent with home exercise program for improved self management of condition   Patient to have decreased subjective report of disability as noted by a score of <30% on the FOTO LE questionnaire    Patient to have improved strength R hamstring by 10% or greater as noted by handheld dynamometer   Patient to be able to ambulate 1 mile with no difficulty for ease with vocational tasks    PLAN   Plan of care Certification: 9/30/2022 to 11/25/2022.     Continue to monitor pt tolerance to exercise and progress strengthening of hamstrings and hips      Evan Lindsay III, PTA

## 2022-11-10 NOTE — PROGRESS NOTES
[facility]   Physical Therapy Treatment Note     Name: Mannie Guerrero  Clinic Number: [id number]    Therapy Diagnosis:   Encounter Diagnoses   Name Primary?    Pain of right thigh Yes    Muscle weakness of lower extremity        Physician: Alexy Davidson MD    Visit Date: 11/11/2022    Physician: Alexy Davidson MD     Physician Orders: PT Eval and Treat   Hamstring stretching Hamstring strengthening  Medical Diagnosis from Referral: S76.311A (ICD-10-CM) - Tear of right hamstring  Evaluation Date: 9/30/2022  Authorization Period Expiration: 12/24/2022  Plan of Care Expiration: 11/25/2022  Progress Note Due: 10/30/2022  Visit # / Visits authorized: 9/ 25   FOTO: 0/5 (11/11/2022)  PTA Visit #: 0/ 5    Precautions: Standard    Time In: 10:45 am   Time Out: 11:25 am  Total Billable Time: 40 minutes    SUBJECTIVE   Pt reports: he is still having increase pain in his L wrist.    He was compliant with home exercise program.   Response to previous treatment: no adverse effects  Functional change: no change reported    Pain: 0/10 hamstring ; 5/10 wrist  Location: R hamstring     OBJECTIVE     10/10/2022     Flexibility:                        Popliteal Angle: R = -12 degrees        CMS Impairment/Limitation/Restriction for FOTO Upper Leg Survey    Therapist reviewed FOTO scores for Mannie Guerrero on 11/11/2022.   FOTO documents entered into [employer] - see Media section.    Limitation Score: 18%  Category: Mobility    Current : CI = at least 1% but < 20% impaired, limited or restricted  Goal: CJ = at least 20% but < 40% impaired, limited or restricted         TREATMENT     Damián received the bolded treatments listed below:      Patient received therapeutic exercises for 35 minutes for improved strength and AROM including:  Half lunge 2 x 40'  Standing knee flexion/ hamstring curls 3# 3 x 10  Bridging (DL) with BTB 3 x 10  LAQ 3 x 10  5#  chair squats with airex 2 x 10   R Heel raises 2  "x 10  Prone hip ext 3 x 10 (off edge of table)  Supine active hamstring stretch 20" x 3 (pain free range)    lateral banded walks BTB 40 ft x 2 laps  Monster walks BTB 2 laps 40 ft    Shuttle R LE 3 x 15 2 black/1 red    Wall squats 3 x 20"    Bike 0'  Glute sets 5" x 20  Hamstring sets submaximal 5" x 10 R  S/L clams BTB 2 x 15 ea  Marches 2 x 10    Toe raises 2 x 10      Patient received manual therapeutic technique for 5 minutes for improved soft tissue and joint mobility including:   STM R hamstrings      Patient received neuromuscular reeducation for 00 minutes for improved proprioception and balance including:      Patient received therapeutic activities for 00 minutes for improved tolerance to functional activities including:        PATIENT EDUCATION AND HOME EXERCISES     Home Exercises Provided and Patient Education Provided     Education provided:   -Educated pt on continued HEP and activity per tolerance     Written Home Exercises Provided: Patient instructed to cont prior HEP. Exercises were reviewed and Damián was able to demonstrate them prior to the end of the session.  Damián demonstrated good  understanding of the education provided. See EMR under Patient Instructions for exercises provided during therapy sessions    ASSESSMENT   Patient progressing well in OP PT. Improved function with FOTO scores noted today.    Damián Is progressing well towards his goals.   Pt prognosis is Good.     Pt will continue to benefit from skilled outpatient physical therapy to address the deficits listed in the problem list box on initial evaluation, provide pt/family education and to maximize pt's level of independence in the home and community environment.     Pt's spiritual, cultural and educational needs considered and pt agreeable to plan of care and goals.     Anticipated barriers to physical therapy: None    Goals: Short Term Goals: 3 weeks   Patient to report pain with ADL's < or = 2/10 R hamstring (met " 10/10/2022)  Patient to be able to bike 5 miles or greater for participation in recreational activities (in progress)  Patient to have improved flexibility R hamstring as noted by 25 deg or less in 90/90 for ease with bending (met 10/10/2022)     Long Term Goals: 6 weeks   Patient to be independent with home exercise program for improved self management of condition   Patient to have decreased subjective report of disability as noted by a score of <30% on the FOTO LE questionnaire  (met)  Patient to have improved strength R hamstring by 10% or greater as noted by handheld dynamometer   Patient to be able to ambulate 1 mile with no difficulty for ease with vocational tasks    PLAN   Plan of care Certification: 9/30/2022 to 11/25/2022.     Continue to monitor pt tolerance to exercise and progress strengthening of hamstrings and hips      Audie Knowles, PT

## 2022-11-11 ENCOUNTER — CLINICAL SUPPORT (OUTPATIENT)
Dept: REHABILITATION | Facility: OTHER | Age: 57
End: 2022-11-11
Payer: MEDICAID

## 2022-11-11 DIAGNOSIS — M62.81 MUSCLE WEAKNESS OF LOWER EXTREMITY: ICD-10-CM

## 2022-11-11 DIAGNOSIS — M79.651 PAIN OF RIGHT THIGH: Primary | ICD-10-CM

## 2022-11-11 PROCEDURE — 97110 THERAPEUTIC EXERCISES: CPT | Mod: PN

## 2022-11-18 ENCOUNTER — CLINICAL SUPPORT (OUTPATIENT)
Dept: REHABILITATION | Facility: OTHER | Age: 57
End: 2022-11-18
Payer: MEDICAID

## 2022-11-18 DIAGNOSIS — M79.651 PAIN OF RIGHT THIGH: Primary | ICD-10-CM

## 2022-11-18 DIAGNOSIS — M62.81 MUSCLE WEAKNESS OF LOWER EXTREMITY: ICD-10-CM

## 2022-11-18 PROCEDURE — 97110 THERAPEUTIC EXERCISES: CPT | Mod: PN,CQ

## 2022-11-18 NOTE — PROGRESS NOTES
"[facility]   Physical Therapy Treatment Note     Name: Mannie Guerrero  Clinic Number: [id number]    Therapy Diagnosis:   Encounter Diagnoses   Name Primary?    Pain of right thigh Yes    Muscle weakness of lower extremity        Physician: Alexy Davidson MD    Visit Date: 11/18/2022    Physician: Alexy Davidson MD     Physician Orders: PT Eval and Treat   Hamstring stretching Hamstring strengthening  Medical Diagnosis from Referral: S76.311A (ICD-10-CM) - Tear of right hamstring  Evaluation Date: 9/30/2022  Authorization Period Expiration: 12/24/2022  Plan of Care Expiration: 11/25/2022  Progress Note Due: 10/30/2022  Visit # / Visits authorized: 10/ 25   FOTO: 1/5 (11/11/2022)  PTA Visit #: 1/ 5    Precautions: Standard    Time In: 12:01 pm   Time Out: 12:44 pm  Total Billable Time: 43 minutes    SUBJECTIVE   Pt reports: Only the wrist is bothering him today    He was compliant with home exercise program.   Response to previous treatment: "Fine"  Functional change: no change reported    Pain: 0/10 hamstring ; 3-4/10 wrist  Location: R hamstring     OBJECTIVE     10/10/2022     Flexibility:                        Popliteal Angle: R = -12 degrees    TREATMENT     Damián received the bolded treatments listed below:      Patient received therapeutic exercises for 43 minutes for improved strength and AROM including:  Half lunge 2 x 40'  Standing knee flexion/ hamstring curls 3# 3 x 10  Bridging (DL) with BTB 3 x 10  LAQ 3 x 10  5#  chair squats with airex 2 x 10   R Heel raises 2 x 10  Prone hip ext 3 x 10 (off edge of table)  Supine active hamstring stretch 20" x 3 (pain free range)    lateral banded walks BTB 40 ft x 2 laps  Monster walks BTB 2 laps 40 ft    Shuttle R LE 3 x 15 2 black/1 red    Wall squats 3 x 20"    Bike 0'  Glute sets 5" x 20  Hamstring sets submaximal 5" x 10 R  S/L clams BTB 2 x 15 ea  Marches 2 x 10    Toe raises 2 x 10      Patient received manual " therapeutic technique for 00 minutes for improved soft tissue and joint mobility including:   STM R hamstrings      Patient received neuromuscular reeducation for 00 minutes for improved proprioception and balance including:      Patient received therapeutic activities for 00 minutes for improved tolerance to functional activities including:        PATIENT EDUCATION AND HOME EXERCISES     Home Exercises Provided and Patient Education Provided     Education provided:   -Educated pt on continued HEP and activity per tolerance     Written Home Exercises Provided: Patient instructed to cont prior HEP. Exercises were reviewed and Damián was able to demonstrate them prior to the end of the session.  Damián demonstrated good  understanding of the education provided. See EMR under Patient Instructions for exercises provided during therapy sessions    ASSESSMENT   Pt tolerated treatment well with no c/o pain. Pt with good quad muscle fatigue.    Damián Is progressing well towards his goals.   Pt prognosis is Good.     Pt will continue to benefit from skilled outpatient physical therapy to address the deficits listed in the problem list box on initial evaluation, provide pt/family education and to maximize pt's level of independence in the home and community environment.     Pt's spiritual, cultural and educational needs considered and pt agreeable to plan of care and goals.     Anticipated barriers to physical therapy: None    Goals: Short Term Goals: 3 weeks   Patient to report pain with ADL's < or = 2/10 R hamstring (met 10/10/2022)  Patient to be able to bike 5 miles or greater for participation in recreational activities (in progress)  Patient to have improved flexibility R hamstring as noted by 25 deg or less in 90/90 for ease with bending (met 10/10/2022)     Long Term Goals: 6 weeks   Patient to be independent with home exercise program for improved self management of condition   Patient to have decreased subjective  report of disability as noted by a score of <30% on the FOTO LE questionnaire  (met)  Patient to have improved strength R hamstring by 10% or greater as noted by handheld dynamometer   Patient to be able to ambulate 1 mile with no difficulty for ease with vocational tasks    PLAN   Plan of care Certification: 9/30/2022 to 11/25/2022.     Continue to monitor pt tolerance to exercise and progress strengthening of hamstrings and hips      Evan Lindsay III, PTA

## 2022-12-02 ENCOUNTER — TELEPHONE (OUTPATIENT)
Dept: SPORTS MEDICINE | Facility: CLINIC | Age: 57
End: 2022-12-02
Payer: MEDICAID

## 2022-12-07 ENCOUNTER — TELEPHONE (OUTPATIENT)
Dept: SLEEP MEDICINE | Facility: CLINIC | Age: 57
End: 2022-12-07
Payer: MEDICAID

## 2022-12-07 NOTE — TELEPHONE ENCOUNTER
----- Message from Vika Zhao sent at 12/7/2022 11:54 AM CST -----  Regarding: Machine Recall  Contact: ARUN KWOK [[id number]]  Name of Who is Calling: Arun Kwok            What is the request in detail:  Pt his machine is on recall and would like staff to give him a call back in regards to what he should do. Please advise           Can the clinic reply by [facility]:  No        What Number to Call Back if not in [facility]:[phone]

## 2022-12-07 NOTE — TELEPHONE ENCOUNTER
Spoke to patient in regards to the message we received. He stated Leonid is going to contact us to get a new script. He ended up giving leonid the wrong number to our office so he will call back to give them the correct number.

## 2023-01-04 ENCOUNTER — TELEPHONE (OUTPATIENT)
Dept: DERMATOLOGY | Facility: CLINIC | Age: 58
End: 2023-01-04
Payer: MEDICAID

## 2023-01-04 NOTE — TELEPHONE ENCOUNTER
NP scheduled for same-day Mohs BCC R nasal ala on 2/27 at 8:00am, referral from Dr. Zhu. Pt confirmed date, time, and location. Over-the phone consult completed. Appt information sent in the mail.

## 2023-01-05 ENCOUNTER — TELEPHONE (OUTPATIENT)
Dept: SLEEP MEDICINE | Facility: CLINIC | Age: 58
End: 2023-01-05
Payer: MEDICAID

## 2023-01-05 NOTE — TELEPHONE ENCOUNTER
----- Message from Melody López sent at 1/5/2023  2:15 PM CST -----  Name of Who is Calling:ARUN KWOK [[id number]]              What is the request in detail:requesting his paperwork be signed for medical release for be signed and faxed to [employer] and [employer].               Can the clinic reply by [facility]:no              What Number to Call Back if not in [facility]:[phone]

## 2023-01-06 DIAGNOSIS — G47.33 OSA (OBSTRUCTIVE SLEEP APNEA): Primary | ICD-10-CM

## 2023-01-10 ENCOUNTER — TELEPHONE (OUTPATIENT)
Dept: SLEEP MEDICINE | Facility: CLINIC | Age: 58
End: 2023-01-10
Payer: MEDICAID

## 2023-01-10 NOTE — TELEPHONE ENCOUNTER
Spoke to patient in regards to the message we received. Pt stated he dropped off paperwork for Toshia to fill out. I informed the patient that once Toshia fills out the paperwork we will get it sent over and also email him a copy.    [employer] fax  [phone]  Account # [phone]

## 2023-01-10 NOTE — TELEPHONE ENCOUNTER
----- Message from Mel Smalls sent at 1/10/2023  1:57 PM CST -----  Regarding: Patient call back  Who called:ARUN KWOK [[id number]]    What is the request in detail: Patient is requesting a call back. He is checking on the status of his paperwork.   Please advise.    Can the clinic reply by [facility]? No    Best call back number:  [phone]             Additional Information: N/A

## 2023-01-10 NOTE — TELEPHONE ENCOUNTER
----- Message from Miguelangel Loco sent at 1/10/2023  2:14 PM CST -----  Regarding: Self [phone]  Type:  Patient Returning Call    Who Called: Self     Who Left Message for Patient: Rebecca Hu    Does the patient know what this is regarding?: unknown    Would the patient rather a call back or a response via [facility]?  Call back    Best Call Back Number: [phone]    Additional Information:     Thank you.

## 2023-01-12 ENCOUNTER — TELEPHONE (OUTPATIENT)
Dept: SLEEP MEDICINE | Facility: CLINIC | Age: 58
End: 2023-01-12
Payer: MEDICAID

## 2023-01-12 NOTE — TELEPHONE ENCOUNTER
----- Message from Eliane Franco MA sent at 1/12/2023 10:23 AM CST -----  Type: Patient Call Back    Who called: Self    What is the request in detail: following up on a paper work that was supposed to faxed off for him ..     Can the clinic reply by [facility]?No    Would the patient rather a call back or a response via [facility]? yes    Best call back number: [phone] (home)

## 2023-01-12 NOTE — TELEPHONE ENCOUNTER
LVM for patient in regards to the message we received. We have faxed his medical form to [employer] as well as [employer].

## 2023-02-27 ENCOUNTER — PROCEDURE VISIT (OUTPATIENT)
Dept: DERMATOLOGY | Facility: CLINIC | Age: 58
End: 2023-02-27
Payer: MEDICAID

## 2023-02-27 VITALS
DIASTOLIC BLOOD PRESSURE: 91 MMHG | HEIGHT: 75 IN | HEART RATE: 64 BPM | SYSTOLIC BLOOD PRESSURE: 135 MMHG | WEIGHT: 262 LBS | BODY MASS INDEX: 32.58 KG/M2

## 2023-02-27 DIAGNOSIS — C44.311 BASAL CELL CARCINOMA OF RIGHT ALA NASI: Primary | ICD-10-CM

## 2023-02-27 PROCEDURE — 17312 MOHS ADDL STAGE: CPT | Mod: PBBFAC | Performed by: DERMATOLOGY

## 2023-02-27 PROCEDURE — 17311: ICD-10-PCS | Mod: S$PBB,,, | Performed by: DERMATOLOGY

## 2023-02-27 PROCEDURE — 17311 MOHS 1 STAGE H/N/HF/G: CPT | Mod: PBBFAC | Performed by: DERMATOLOGY

## 2023-02-27 PROCEDURE — 99499 NO LOS: ICD-10-PCS | Mod: S$PBB,,, | Performed by: DERMATOLOGY

## 2023-02-27 PROCEDURE — 17311 MOHS 1 STAGE H/N/HF/G: CPT | Mod: S$PBB,,, | Performed by: DERMATOLOGY

## 2023-02-27 PROCEDURE — 99499 UNLISTED E&M SERVICE: CPT | Mod: S$PBB,,, | Performed by: DERMATOLOGY

## 2023-02-27 PROCEDURE — 17312: ICD-10-PCS | Mod: S$PBB,,, | Performed by: DERMATOLOGY

## 2023-02-27 PROCEDURE — 17312 MOHS ADDL STAGE: CPT | Mod: S$PBB,,, | Performed by: DERMATOLOGY

## 2023-02-27 NOTE — PROGRESS NOTES
New patient with a 3+ months h/o growth on the R nasal ala. (+) scabbed, crusted. Denies bleeding. Biopsy c/w BCC. No prior treatment.    Physical Exam   HENT:   Nose:         R lateral nasal supratip with a 4 x 4 mm pink bx site located 3.5 cm inferiorly from the right medial canthus and 3 cm superiorly from the right alar base.    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  Diagnosis, photograph, and pathology report were reviewed with patient.  Discussed risks, benefits, and alternatives of Mohs surgery.  Discussed repair options including complex closure, skin flap, skin graft, and second intention healing.  Patient agreed to proceed with Mohs surgery today.    PROCEDURE: Mohs' Micrographic Surgery    INDICATION: Location in mask areas of face including central face, nose, eyelids, eyebrows, lips, chin, preauricular, temple, and ear. Biopsy-proven skin cancer of cosmetically and functionally important areas, including head, neck, genital, hand, foot, or areas known for having difficulty in healing, such as the lower anterior legs. Tumor with ill-defined borders.    REFERRING PROVIDER:  Vivian Zhu MD    CASE NUMBER:     ANESTHETIC: 1 cc 1% Lidocaine, plain and 2 cc 0.5% Bupivacaine with Epi 1:200,000 mixed 1:1 with plain 1% Lidocaine    SURGICAL PREP: Hibiclens    SURGEON: Tyler An MD    ASSISTANTS: Tangela Montilla PA-C and Melony Bush, Surg Tech    PREOPERATIVE DIAGNOSIS: basal cell carcinoma- nodular    POSTOPERATIVE DIAGNOSIS: basal cell carcinoma- nodular, infiltrating, superficial    PATHOLOGIC DIAGNOSIS: basal cell carcinoma- nodular, infiltrating, superficial    HISTOLOGY OF SPECIMENS IN FIRST STAGE:   Debulking tumor confirms nodular and infiltrating basal cell carcinoma.  Tumor Type: Tumor seen. Superficial basal cell carcinoma: Foci of basaloid cells with peripheral palisading and focal retraction artifact arising along the dermoepidermal  "junction and extending into the papillary dermis.   Depth of Invasion: epidermis and dermis  Perineural Invasion: No    HISTOLOGY OF SPECIMENS IN SUBSEQUENT STAGES:  Tumor Type:  No tumor seen.    STAGES OF MOHS' SURGERY PERFORMED: 2    TUMOR-FREE PLANE ACHIEVED: Yes    HEMOSTASIS: electrocoagulation     SPECIMENS: 3 (2 in stage A and 1 in stage B)    LOCATION: right nasal ala (R lateral supratip). Location verified with Dr. Zhu's clinical photograph. Patient also verified location with hand held mirror.    INITIAL LESION SIZE: 0.4 x 0.4 cm    FINAL DEFECT SIZE: 0.6 x 0.7 cm    WOUND REPAIR/DISPOSITION: When the tumor was completely removed, repair options were discussed with the patient, and it was decided to let the wound heal by second intention. The patient tolerated the procedure well and will consider delayed reconstruction or repair if necessary.    The area was cleaned and dressed appropriately, and the patient was given wound care instructions, as well as an appointment for follow-up evaluation in one month.    Vitals:    02/27/23 0801 02/27/23 1048   BP: 135/75 (!) 135/91   BP Location: Right arm    Patient Position: Sitting    BP Method: Medium (Automatic)    Pulse: 75 64   Weight: 118.8 kg (262 lb)    Height: 6' 3" (1.905 m)          "

## 2023-02-28 ENCOUNTER — TELEPHONE (OUTPATIENT)
Dept: SLEEP MEDICINE | Facility: CLINIC | Age: 58
End: 2023-02-28
Payer: MEDICAID

## 2023-02-28 NOTE — TELEPHONE ENCOUNTER
----- Message from Yaneth Greenwood sent at 2/28/2023  1:22 PM CST -----  Name of Who is Calling: ARUN KWOK [[id number]]            What is the request in detail: Patient is requesting call back to get records for machine that was given for apena in 2016              Can the clinic reply by [facility]: yes              What Number to Call Back if not in [facility]: [phone]

## 2023-03-14 ENCOUNTER — HOSPITAL ENCOUNTER (EMERGENCY)
Facility: HOSPITAL | Age: 58
Discharge: HOME | End: 2023-03-14
Attending: EMERGENCY MEDICINE
Payer: MEDICAID

## 2023-03-14 VITALS
WEIGHT: 270 LBS | BODY MASS INDEX: 33.57 KG/M2 | HEIGHT: 75 IN | HEART RATE: 65 BPM | RESPIRATION RATE: 18 BRPM | SYSTOLIC BLOOD PRESSURE: 143 MMHG | OXYGEN SATURATION: 99 % | DIASTOLIC BLOOD PRESSURE: 90 MMHG | TEMPERATURE: 98 F

## 2023-03-14 DIAGNOSIS — S70.01XA CONTUSION OF RIGHT HIP, INITIAL ENCOUNTER: Primary | ICD-10-CM
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
## 2023-03-14 DIAGNOSIS — S80.02XA CONTUSION OF LEFT KNEE, INITIAL ENCOUNTER: ICD-10-CM

## 2023-03-14 PROCEDURE — 25000003 PHARM REV CODE 250: Performed by: EMERGENCY MEDICINE

## 2023-03-14 PROCEDURE — 99284 EMERGENCY DEPT VISIT MOD MDM: CPT

## 2023-03-14 PROCEDURE — 99284 EMERGENCY DEPT VISIT MOD MDM: CPT | Mod: ,,, | Performed by: EMERGENCY MEDICINE

## 2023-03-14 PROCEDURE — 63600175 PHARM REV CODE 636 W HCPCS: Performed by: EMERGENCY MEDICINE

## 2023-03-14 PROCEDURE — 99284 PR EMERGENCY DEPT VISIT,LEVEL IV: ICD-10-PCS | Mod: ,,, | Performed by: EMERGENCY MEDICINE

## 2023-03-14 PROCEDURE — 96372 THER/PROPH/DIAG INJ SC/IM: CPT | Performed by: EMERGENCY MEDICINE

## 2023-03-14 RX ORDER — ACETAMINOPHEN 500 MG
500 TABLET ORAL EVERY 6 HOURS PRN
Qty: 60 TABLET | Refills: 0 | Status: SHIPPED | OUTPATIENT
Start: 2023-03-14 | End: 2023-03-14 | Stop reason: SDUPTHER

## 2023-03-14 RX ORDER — KETOROLAC TROMETHAMINE 30 MG/ML
15 INJECTION, SOLUTION INTRAMUSCULAR; INTRAVENOUS
Status: COMPLETED | OUTPATIENT
Start: 2023-03-14 | End: 2023-03-14

## 2023-03-14 RX ORDER — LIDOCAINE 50 MG/G
1 PATCH TOPICAL
Status: DISCONTINUED | OUTPATIENT
Start: 2023-03-14 | End: 2023-03-14 | Stop reason: HOSPADM

## 2023-03-14 RX ORDER — ACETAMINOPHEN 500 MG
500 TABLET ORAL EVERY 6 HOURS PRN
Qty: 60 TABLET | Refills: 0 | Status: SHIPPED | OUTPATIENT
Start: 2023-03-14

## 2023-03-14 RX ORDER — LIDOCAINE 50 MG/G
1 PATCH TOPICAL DAILY
Qty: 15 PATCH | Refills: 0 | Status: SHIPPED | OUTPATIENT
Start: 2023-03-14

## 2023-03-14 RX ORDER — CYCLOBENZAPRINE HCL 10 MG
10 TABLET ORAL 3 TIMES DAILY PRN
Qty: 15 TABLET | Refills: 0 | Status: SHIPPED | OUTPATIENT
Start: 2023-03-14 | End: 2023-03-19

## 2023-03-14 RX ORDER — HYDROCODONE BITARTRATE AND ACETAMINOPHEN 5; 325 MG/1; MG/1
1 TABLET ORAL
Status: COMPLETED | OUTPATIENT
Start: 2023-03-14 | End: 2023-03-14

## 2023-03-14 RX ADMIN — LIDOCAINE 1 PATCH: 50 PATCH CUTANEOUS at 12:03

## 2023-03-14 RX ADMIN — KETOROLAC TROMETHAMINE 15 MG: 30 INJECTION, SOLUTION INTRAMUSCULAR; INTRAVENOUS at 11:03

## 2023-03-14 RX ADMIN — HYDROCODONE BITARTRATE AND ACETAMINOPHEN 1 TABLET: 5; 325 TABLET ORAL at 11:03

## 2023-03-14 NOTE — DISCHARGE INSTRUCTIONS
I have placed a referral the spine clinic for you     I provided you a muscle relaxer, topical numbing patch for nerve pain, and prescription strength acetaminophen     Return to the emergency department if you experience numbness, weakness, difficulty urinating, or any new or concerning symptoms
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
## 2023-03-15 NOTE — PLAN OF CARE
Renae faxed ambulatory referral/ consult to [facility] for back and spine.         Donny Winslow LMSW  Case Management  Emergency Department  [phone]

## 2023-03-17 DIAGNOSIS — M17.0 BILATERAL PRIMARY OSTEOARTHRITIS OF KNEE: Primary | ICD-10-CM

## 2023-03-20 ENCOUNTER — TELEPHONE (OUTPATIENT)
Dept: PAIN MEDICINE | Facility: CLINIC | Age: 58
End: 2023-03-20
Payer: MEDICAID

## 2023-03-20 NOTE — TELEPHONE ENCOUNTER
Staff contacted patient in regards to his message.      Staff informed pt due to his insurance being Medicaid he would have to contact the the Appointment Expansion Hotline at [phone]      Pt verbalized understanding

## 2023-03-20 NOTE — TELEPHONE ENCOUNTER
----- Message from Melody López sent at 3/20/2023  1:09 PM CDT -----  Name of Who is Calling:ARUN KWOK [[id number]]              What is the request in detail:Requesting a call back to schedule from referral.               Can the clinic reply by [facility]:              What Number to Call Back if not in [facility]:[phone]

## 2023-03-27 ENCOUNTER — OFFICE VISIT (OUTPATIENT)
Dept: DERMATOLOGY | Facility: CLINIC | Age: 58
End: 2023-03-27
Payer: MEDICAID

## 2023-03-27 DIAGNOSIS — C44.311 BASAL CELL CARCINOMA OF RIGHT ALA NASI: Primary | ICD-10-CM

## 2023-03-27 PROCEDURE — 1159F MED LIST DOCD IN RCRD: CPT | Mod: CPTII,,, | Performed by: DERMATOLOGY

## 2023-03-27 PROCEDURE — 99212 OFFICE O/P EST SF 10 MIN: CPT | Mod: PBBFAC | Performed by: DERMATOLOGY

## 2023-03-27 PROCEDURE — 99999 PR PBB SHADOW E&M-EST. PATIENT-LVL II: CPT | Mod: PBBFAC,,, | Performed by: DERMATOLOGY

## 2023-03-27 PROCEDURE — 99212 OFFICE O/P EST SF 10 MIN: CPT | Mod: S$PBB,,, | Performed by: DERMATOLOGY

## 2023-03-27 PROCEDURE — 99999 PR PBB SHADOW E&M-EST. PATIENT-LVL II: ICD-10-PCS | Mod: PBBFAC,,, | Performed by: DERMATOLOGY

## 2023-03-27 PROCEDURE — 1159F PR MEDICATION LIST DOCUMENTED IN MEDICAL RECORD: ICD-10-PCS | Mod: CPTII,,, | Performed by: DERMATOLOGY

## 2023-03-27 PROCEDURE — 99212 PR OFFICE/OUTPT VISIT, EST, LEVL II, 10-19 MIN: ICD-10-PCS | Mod: S$PBB,,, | Performed by: DERMATOLOGY

## 2023-03-27 NOTE — PROGRESS NOTES
57 y.o. male patient is here for wound check after surgery.    Patient reports no problems.    WOUND PE:  The R nasal ala wound is well healed with 100% re-epithelialization. No nodularity.    IMPRESSION:  Healing operative site from Mohs surgery, BCC R nasal ala s/p Mohs with second-intention healing, postop week #4, all healed in.    PLAN:  D/c wound care  Keep moist with aquaphor for another week  Reassured patient that redness and firmness will fade with time.  Daily SPF.  Regular skin checks.    RTC:  In 3-6 months with  Vivian Zhu MD  for skin check or sooner if new concern arises.

## 2023-03-30 NOTE — PROGRESS NOTES
Last s een 10/2020, JEANNINE    He continues to use cpap 14cmqhs. Machine doesn't have filter anymore/broken area. Wants new machine Hasn't tried sleeping w/o it Has battery pack.Values his sleep, since using machine not had depressive episode. Sees psychiatry for mood, on seroquel for sleep.  Pt did not bring machine for interrogation.     [facility].     CPAP titration study @ [facility] 06/11/2018 CPAP 14 cm  Baseline Sleep Study: PSG/ SPLIT night study  In 2015 [facility].   showed significant JEANNINE with the AHI of 20/hour and SaO2 minimum of 96 %. Pressure 8 cm was advised    Assessment:    JEANNINE, moderate by AHI criteria, remains adherent on CPAP,benefits. Eligible new machine, his machine/filter area broken  Bipolar II d/o    Plan:   Continue cpap 14cm until get apap 14-18cm via [facility]   Continue to see psychiatry/continue meds  RTC in 12 months, sooner if needed.

## 2023-03-31 ENCOUNTER — OFFICE VISIT (OUTPATIENT)
Dept: SLEEP MEDICINE | Facility: CLINIC | Age: 58
End: 2023-03-31
Payer: MEDICAID

## 2023-03-31 VITALS
BODY MASS INDEX: 33.1 KG/M2 | SYSTOLIC BLOOD PRESSURE: 143 MMHG | HEART RATE: 80 BPM | DIASTOLIC BLOOD PRESSURE: 87 MMHG | WEIGHT: 264.81 LBS

## 2023-03-31 DIAGNOSIS — F31.81 BIPOLAR II DISORDER: ICD-10-CM

## 2023-03-31 DIAGNOSIS — G47.33 OSA (OBSTRUCTIVE SLEEP APNEA): Primary | ICD-10-CM

## 2023-03-31 PROCEDURE — 99999 PR PBB SHADOW E&M-EST. PATIENT-LVL II: CPT | Mod: PBBFAC,,, | Performed by: NURSE PRACTITIONER

## 2023-03-31 PROCEDURE — 99212 OFFICE O/P EST SF 10 MIN: CPT | Mod: PBBFAC | Performed by: NURSE PRACTITIONER

## 2023-03-31 PROCEDURE — 99213 OFFICE O/P EST LOW 20 MIN: CPT | Mod: S$PBB,,, | Performed by: NURSE PRACTITIONER

## 2023-03-31 PROCEDURE — 99999 PR PBB SHADOW E&M-EST. PATIENT-LVL II: ICD-10-PCS | Mod: PBBFAC,,, | Performed by: NURSE PRACTITIONER

## 2023-03-31 PROCEDURE — 99213 PR OFFICE/OUTPT VISIT, EST, LEVL III, 20-29 MIN: ICD-10-PCS | Mod: S$PBB,,, | Performed by: NURSE PRACTITIONER

## 2023-04-05 ENCOUNTER — CLINICAL SUPPORT (OUTPATIENT)
Dept: REHABILITATION | Facility: OTHER | Age: 58
End: 2023-04-05
Payer: MEDICAID

## 2023-04-05 DIAGNOSIS — M17.0 BILATERAL PRIMARY OSTEOARTHRITIS OF KNEE: ICD-10-CM

## 2023-04-05 DIAGNOSIS — M17.0 PRIMARY OSTEOARTHRITIS OF BOTH KNEES: ICD-10-CM

## 2023-04-05 PROCEDURE — 97110 THERAPEUTIC EXERCISES: CPT | Mod: PN

## 2023-04-05 PROCEDURE — 97161 PT EVAL LOW COMPLEX 20 MIN: CPT | Mod: PN
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
## 2023-04-11 ENCOUNTER — CLINICAL SUPPORT (OUTPATIENT)
Dept: REHABILITATION | Facility: OTHER | Age: 58
End: 2023-04-11
Payer: MEDICAID

## 2023-04-11 DIAGNOSIS — R29.898 WEAKNESS OF BOTH LOWER EXTREMITIES: ICD-10-CM

## 2023-04-11 PROCEDURE — 97110 THERAPEUTIC EXERCISES: CPT | Mod: PN

## 2023-04-11 NOTE — PROGRESS NOTES
"  [facility]   Physical Therapy Treatment Note     Name: Mannie Steel [facility]  Clinic Number: [id number]    Therapy Diagnosis:   Encounter Diagnosis   Name Primary?    Weakness of both lower extremities      Physician: Glo Jaime MD    Visit Date: 4/11/2023    Physician Orders: Physical Therapy Evaluate and Treat  Medical Diagnosis from Referral: Bilateral primary osteoarthritis of knee [M17.0]  Evaluation Date: 4/5/2023  Authorization Period Expiration: 3/16/2024  Plan of Care Expiration: 4/5/2023 to 6/5/2023  Visit # / Visits authorized: 1/20   FOTO: #1/3 on 4/5/23  PTA Visit #: 0/5     Time In: 900a  Time Out: 1000a  Total Billable Time: 60 minutes    SUBJECTIVE     Pt reports: decreased knee pain.  He was compliant with home exercise program.  Response to previous treatment: decreased knee pain  Functional change: improved     Pain:  Current 5/10, worst 7/10, best 3/10   Location: bilateral knee   Description: Aching and Dull  Aggravating Factors: Standing and Walking  Easing Factors: lying down and rest     Pts goals: Pt would like to return to hiking with no increase in bilateral knee pain.    OBJECTIVE     Objective Measures updated at progress report unless specified.     Treatment     Damián received the treatments listed below in bold:      therapeutic exercises to develop strength, endurance, ROM, and core stabilization for 40 minutes including:    Recumbent bike L4 8 minutes  Supine knee extension props 3#  Supine hamstring stretch with strap 3x30"  SLR 3# 2x20  SL hip abduction 2x20  Prone hip extension 2x20  Prone knee flexion with strap 3x30"  Prone femoral nerve glide x20  Shuttle squats 50# 3x20      manual therapy techniques: Joint mobilizations, Myofacial release, and Soft tissue Mobilization were applied to the: bilateral knees for 00 minutes, including:    Patellar mobilization    neuromuscular re-education activities to improve: Balance, Coordination, " Kinesthetic, Sense, Proprioception, and Posture for 10 minutes. The following activities were included:    SLS with ball toss forward/90 degrees each side x10 mins    Patient Education and Home Exercises     Home Exercises and Patient Education Provided:     Education provided:   - Findings; prognosis and plan of care (POC)  - Home exercise program (HEP)  - Modality options  - Therapist contact information     Written Home Exercises Provided: Yes  Exercises were reviewed and Damián was able to demonstrate them prior to the end of the session.  Damián demonstrated good understanding of the education provided.       ASSESSMENT     Pt tolerated therapeutic interventions well with no complaint of increased pain. Patient reported relief after physical therapy treatment today. Patient had some difficulty with prone knee flexion at first but the pain subsided after approx 10 reps. We will continue to progress therapeutic exercise as tolerated.    Damián Is progressing well towards his goals.   Pt prognosis is Excellent.     Pt will continue to benefit from skilled outpatient physical therapy to address the deficits listed in the problem list box on initial evaluation, provide pt/family education and to maximize pt's level of independence in the home and community environment.     Pt's spiritual, cultural and educational needs considered and pt agreeable to plan of care and goals.     Anticipated barriers to physical therapy: NA    Goals: GOALS:  Short Term Goals:     1.) Pt will improve their FOTO score by 5% to return to PLOF. (Progressing, not met)  2.) Pt will decrease their left knee pain to 4/10 for improved QOL. (Progressing, not met)  3.) Pt will improve their right knee flexion AROM to 125 degrees for improved community ambulation. (Progressing, not met)  4.) Pt will improve their hip abduction strength to 4+/5 to return to their PLOF. (Progressing, not met)  5.) Pt will become independent with their HEP to improve  strength and tolerance to functional activities. (Progressing, not met)     Long Term Goals:  1.) Pt will improve their FOTO score by 10% to return to PLOF. (Progressing, not met)  2.) Pt will decrease their left knee pain to 2/10 for improved QOL. (Progressing, not met)  3.) Pt will improve their left knee flexion AROM to 130 degrees for improved hiking. (Progressing, not met)  4.) Pt will improve their hip flexion strength to 5/5 to return to their PLOF. (Progressing, not met)  5.) Pt will tolerate walking a quarter of a mile with no increase in left knee pain to return to walking dogs. (Progressing, not met)     PLAN     Plan of care Certification: 4/5/2023 to 6/5/2023.     Outpatient Physical Therapy 2 times weekly for 4 weeks to include the following interventions: Therapeutic Exercises, Manual Therapeutic Technique, Neuromuscular Re Education, Therapeutic Activities. Modalities, Kinesiotape prn, and Functional Dry Needling as needed.    Heri Pink, PT

## 2023-04-12 PROBLEM — R29.898 LEG WEAKNESS: Status: ACTIVE | Noted: 2023-04-12

## 2023-04-13 ENCOUNTER — CLINICAL SUPPORT (OUTPATIENT)
Dept: REHABILITATION | Facility: OTHER | Age: 58
End: 2023-04-13
Payer: MEDICAID

## 2023-04-13 DIAGNOSIS — M17.0 PRIMARY OSTEOARTHRITIS OF BOTH KNEES: Primary | ICD-10-CM

## 2023-04-13 PROCEDURE — 97110 THERAPEUTIC EXERCISES: CPT | Mod: PN,CQ

## 2023-04-13 NOTE — PROGRESS NOTES
"  [facility]   Physical Therapy Treatment Note     Name: Mannie Steel [facility]  Clinic Number: [id number]    Therapy Diagnosis:   Encounter Diagnosis   Name Primary?    Primary osteoarthritis of both knees Yes       Physician: Glo Jaime MD    Visit Date: 4/13/2023    Physician Orders: Physical Therapy Evaluate and Treat  Medical Diagnosis from Referral: Bilateral primary osteoarthritis of knee [M17.0]  Evaluation Date: 4/5/2023  Authorization Period Expiration: 3/16/2024  Plan of Care Expiration: 4/5/2023 to 6/5/2023  Visit # / Visits authorized: 3/20   FOTO: #1/3 on 4/5/23      Time In: 0953  Time Out: 1053  Total Billable Time: 60 minutes    SUBJECTIVE     Pt reports: feeling pain in his knees L>R.   He was compliant with home exercise program.  Response to previous treatment: decreased knee pain  Functional change: improved     Pain:  Current 5/10, worst 7/10, best 3/10   Location: bilateral knee   Description: Aching and Dull  Aggravating Factors: Standing and Walking  Easing Factors: lying down and rest     Pts goals: Pt would like to return to hiking with no increase in bilateral knee pain.    OBJECTIVE     Objective Measures updated at progress report unless specified.     Treatment     Damián received the treatments listed below in bold:      therapeutic exercises to develop strength, endurance, ROM, and core stabilization for 60 minutes including:    Recumbent bike L4 8 minutes  +Straight leg bridges on blue ball, 3" hold, 20x  Supine knee extension props 3#  R/L Supine hamstring stretch with strap 3x30"  R/L SLR 3# 2x20 R/L  R/L SL hip abduction 2x20  Prone hip extension 2x20  Prone knee flexion with strap 3x30"  Prone femoral nerve glide x20  Shuttle squats 50# 3x20  +Step ups 6"  20x R/L  +TKE with red power band, 30x R/L  +LAQ 3# 30x R/L      manual therapy techniques: Joint mobilizations, Myofacial release, and Soft tissue Mobilization were applied to the: bilateral " knees for 00 minutes, including:    Patellar mobilization    neuromuscular re-education activities to improve: Balance, Coordination, Kinesthetic, Sense, Proprioception, and Posture for 00 minutes. The following activities were included:    SLS with ball toss forward/90 degrees each side x10 mins    Patient Education and Home Exercises     Home Exercises and Patient Education Provided:     Education provided:   - Exercise form and rationale      Written Home Exercises Provided: Patient was instructed to cont with previous HEP  Exercises were reviewed and Damián was able to demonstrate them prior to the end of the session.  Damián demonstrated good understanding of the education provided.       ASSESSMENT     Damián tolerated exercise well, Continued with quad/glute strength with emphasis on eccentrics for improved muscular control. Added functional step ups and sit to stand for improved ADL performance with good training effect noted.      Damián Is progressing well towards his goals.   Pt prognosis is Excellent.     Pt will continue to benefit from skilled outpatient physical therapy to address the deficits listed in the problem list box on initial evaluation, provide pt/family education and to maximize pt's level of independence in the home and community environment.     Pt's spiritual, cultural and educational needs considered and pt agreeable to plan of care and goals.     Anticipated barriers to physical therapy: NA    Goals: GOALS:  Short Term Goals:     1.) Pt will improve their FOTO score by 5% to return to PLOF. (Progressing, not met)  2.) Pt will decrease their left knee pain to 4/10 for improved QOL. (Progressing, not met)  3.) Pt will improve their right knee flexion AROM to 125 degrees for improved community ambulation. (Progressing, not met)  4.) Pt will improve their hip abduction strength to 4+/5 to return to their PLOF. (Progressing, not met)  5.) Pt will become independent with their HEP to improve  strength and tolerance to functional activities. (Progressing, not met)     Long Term Goals:  1.) Pt will improve their FOTO score by 10% to return to PLOF. (Progressing, not met)  2.) Pt will decrease their left knee pain to 2/10 for improved QOL. (Progressing, not met)  3.) Pt will improve their left knee flexion AROM to 130 degrees for improved hiking. (Progressing, not met)  4.) Pt will improve their hip flexion strength to 5/5 to return to their PLOF. (Progressing, not met)  5.) Pt will tolerate walking a quarter of a mile with no increase in left knee pain to return to walking dogs. (Progressing, not met)     PLAN     Plan of care Certification: 4/5/2023 to 6/5/2023.    Focus on LE strength, knee ROM and ADL performance      Outpatient Physical Therapy 2 times weekly for 4 weeks to include the following interventions: Therapeutic Exercises, Manual Therapeutic Technique, Neuromuscular Re Education, Therapeutic Activities. Modalities, Kinesiotape prn, and Functional Dry Needling as needed.    Lucian Mon, PTA

## 2023-04-18 ENCOUNTER — CLINICAL SUPPORT (OUTPATIENT)
Dept: REHABILITATION | Facility: OTHER | Age: 58
End: 2023-04-18
Payer: MEDICAID

## 2023-04-18 DIAGNOSIS — R29.898 WEAKNESS OF BOTH LOWER EXTREMITIES: Primary | ICD-10-CM

## 2023-04-18 PROCEDURE — 97110 THERAPEUTIC EXERCISES: CPT | Mod: PN

## 2023-04-18 PROCEDURE — 97112 NEUROMUSCULAR REEDUCATION: CPT | Mod: PN

## 2023-04-18 NOTE — PROGRESS NOTES
"    [facility]   Physical Therapy Treatment Note     Name: Mannie Steel [facility]  Clinic Number: [id number]    Therapy Diagnosis:   Encounter Diagnosis   Name Primary?    Weakness of both lower extremities Yes       Physician: Glo Jaime MD    Visit Date: 4/18/2023    Physician Orders: Physical Therapy Evaluate and Treat  Medical Diagnosis from Referral: Bilateral primary osteoarthritis of knee [M17.0]  Evaluation Date: 4/5/2023  Authorization Period Expiration: 3/16/2024  Plan of Care Expiration: 4/5/2023 to 6/5/2023  Visit # / Visits authorized: 3/20   FOTO: #1/3 on 4/5/23      Time In: 1055  Time Out: 1155  Total Billable Time: 60 minutes    SUBJECTIVE     Pt reports: feeling pain in his knees L>R.   He was compliant with home exercise program.  Response to previous treatment: decreased knee pain  Functional change: improved     Pain:  Current 5/10, worst 7/10, best 3/10   Location: bilateral knee   Description: Aching and Dull  Aggravating Factors: Standing and Walking  Easing Factors: lying down and rest     Pts goals: Pt would like to return to hiking with no increase in bilateral knee pain.    OBJECTIVE     Objective Measures updated at progress report unless specified.     Treatment     Damián received the treatments listed below in bold:      therapeutic exercises to develop strength, endurance, ROM, and core stabilization for 50 minutes including:    Recumbent bike L4 8 minutes  +Straight leg bridges on blue ball, 3" hold, 20x  Supine knee extension props 3#  R/L Supine hamstring stretch with strap 3x30"  R/L SLR 3# 2x20 R/L  R/L SL hip abduction 2x20  Prone hip extension 2x20  Prone knee flexion with strap 3x30"  Prone femoral nerve glide x20  Shuttle squats 75# 2x20  SL shuttle squats 50# 2x10  +Step ups 6"  20x R/L  +TKE with red power band, 30x R/L  +LAQ 3# 30x R/L      manual therapy techniques: Joint mobilizations, Myofacial release, and Soft tissue Mobilization were " applied to the: bilateral knees for 00 minutes, including:    Patellar mobilization    neuromuscular re-education activities to improve: Balance, Coordination, Kinesthetic, Sense, Proprioception, and Posture for 10 minutes. The following activities were included:    SLS with ball toss forward/90 degrees each side x10 mins    Patient Education and Home Exercises     Home Exercises and Patient Education Provided:     Education provided:   - Exercise form and rationale      Written Home Exercises Provided: Patient was instructed to cont with previous HEP  Exercises were reviewed and Damián was able to demonstrate them prior to the end of the session.  Damián demonstrated good understanding of the education provided.       ASSESSMENT     Damián tolerated exercise well. He continues to have some anterior knee pain with extreme knee flexion. He responded well to femoral nerve glide today reporting decreased pain after exercise. We will continue to progress therapeutic exercise as tolerated.    Damián Is progressing well towards his goals.   Pt prognosis is Excellent.     Pt will continue to benefit from skilled outpatient physical therapy to address the deficits listed in the problem list box on initial evaluation, provide pt/family education and to maximize pt's level of independence in the home and community environment.     Pt's spiritual, cultural and educational needs considered and pt agreeable to plan of care and goals.     Anticipated barriers to physical therapy: NA    Goals: GOALS:  Short Term Goals:     1.) Pt will improve their FOTO score by 5% to return to PLOF. (Progressing, not met)  2.) Pt will decrease their left knee pain to 4/10 for improved QOL. (Progressing, not met)  3.) Pt will improve their right knee flexion AROM to 125 degrees for improved community ambulation. (Progressing, not met)  4.) Pt will improve their hip abduction strength to 4+/5 to return to their PLOF. (Progressing, not met)  5.) Pt will  become independent with their HEP to improve strength and tolerance to functional activities. (Progressing, not met)     Long Term Goals:  1.) Pt will improve their FOTO score by 10% to return to PLOF. (Progressing, not met)  2.) Pt will decrease their left knee pain to 2/10 for improved QOL. (Progressing, not met)  3.) Pt will improve their left knee flexion AROM to 130 degrees for improved hiking. (Progressing, not met)  4.) Pt will improve their hip flexion strength to 5/5 to return to their PLOF. (Progressing, not met)  5.) Pt will tolerate walking a quarter of a mile with no increase in left knee pain to return to walking dogs. (Progressing, not met)     PLAN     Plan of care Certification: 4/5/2023 to 6/5/2023.    Focus on LE strength, knee ROM and ADL performance      Outpatient Physical Therapy 2 times weekly for 4 weeks to include the following interventions: Therapeutic Exercises, Manual Therapeutic Technique, Neuromuscular Re Education, Therapeutic Activities. Modalities, Kinesiotape prn, and Functional Dry Needling as needed.    Heri Pink, PT

## 2023-04-19 NOTE — PROGRESS NOTES
"    [facility]   Physical Therapy Treatment Note     Name: Mannie Steel [facility]  Clinic Number: [id number]    Therapy Diagnosis:   Encounter Diagnosis   Name Primary?    Weakness of both lower extremities Yes         Physician: Glo Jaime MD    Visit Date: 4/20/2023    Physician Orders: Physical Therapy Evaluate and Treat  Medical Diagnosis from Referral: Bilateral primary osteoarthritis of knee [M17.0]  Evaluation Date: 4/5/2023  Authorization Period Expiration: 3/16/2024  Plan of Care Expiration: 4/5/2023 to 6/5/2023  Visit # / Visits authorized: 4/20   FOTO: #1/3 on 4/5/23      Time In: 0852  Time Out: 0945  Total Billable Time: 53 minutes    SUBJECTIVE     Pt reports: he felt sore after last session but over not too bad today. States he has to leave a little early due to an appointment.   He was compliant with home exercise program.  Response to previous treatment: felt sore  Functional change: n/a    Pain:  Current 4/10, worst 7/10, best 3/10   Location: bilateral knee   Description: Aching and Dull  Aggravating Factors: Standing and Walking  Easing Factors: lying down and rest     Pts goals: Pt would like to return to hiking with no increase in bilateral knee pain.    OBJECTIVE     Objective Measures updated at progress report unless specified.     Treatment     Damián received the treatments listed below in bold:      therapeutic exercises to develop strength, endurance, ROM, and core stabilization for  53 minutes including:    Recumbent bike L4 8 minutes  Straight leg bridges on blue ball, 3" hold, 20x  Supine knee extension props 3#  R/L Supine hamstring stretch with strap 3x30"  R/L SLR 2# 2x20 R/L  R/L SL hip abduction 2# 1x20  Prone hip extension 2x20  Prone knee flexion with strap 3x30"  Prone femoral nerve glide x20  Shuttle squats 75# 2x20  SL shuttle squats 50# 2x20 R/L  Step ups 6"  20x R/L  TKE with red power band, 30x R/L  LAQ 3# 30x R/L      manual therapy " techniques: Joint mobilizations, Myofacial release, and Soft tissue Mobilization were applied to the: bilateral knees for 00 minutes, including:    Patellar mobilization    neuromuscular re-education activities to improve: Balance, Coordination, Kinesthetic, Sense, Proprioception, and Posture for 00 minutes. The following activities were included:    SLS with ball toss forward/90 degrees each side x10 mins    Patient Education and Home Exercises     Home Exercises and Patient Education Provided:     Education provided:   - Exercise form and rationale      Written Home Exercises Provided: Patient was instructed to cont with previous HEP  Exercises were reviewed and Damián was able to demonstrate them prior to the end of the session.  Damián demonstrated good understanding of the education provided.       ASSESSMENT     Damián tolerated exercise well this visit. Glute /quad weakness continues to remain notable with slight extensor lag present with SLR. Anterior knee pain continues to be notable with CKC exercises however pt was able to resume CKC/functional step ups. Resistance was decreased with straight plane exercises as not to exacerbate symptoms/DOMS. Pt had no complaints post tx.     Damián Is progressing well towards his goals.   Pt prognosis is Excellent.     Pt will continue to benefit from skilled outpatient physical therapy to address the deficits listed in the problem list box on initial evaluation, provide pt/family education and to maximize pt's level of independence in the home and community environment.     Pt's spiritual, cultural and educational needs considered and pt agreeable to plan of care and goals.     Anticipated barriers to physical therapy: NA    Goals: GOALS:  Short Term Goals:     1.) Pt will improve their FOTO score by 5% to return to PLOF. (Progressing, not met)  2.) Pt will decrease their left knee pain to 4/10 for improved QOL. (Progressing, not met)  3.) Pt will improve their right knee  flexion AROM to 125 degrees for improved community ambulation. (Progressing, not met)  4.) Pt will improve their hip abduction strength to 4+/5 to return to their PLOF. (Progressing, not met)  5.) Pt will become independent with their HEP to improve strength and tolerance to functional activities. (Progressing, not met)     Long Term Goals:  1.) Pt will improve their FOTO score by 10% to return to PLOF. (Progressing, not met)  2.) Pt will decrease their left knee pain to 2/10 for improved QOL. (Progressing, not met)  3.) Pt will improve their left knee flexion AROM to 130 degrees for improved hiking. (Progressing, not met)  4.) Pt will improve their hip flexion strength to 5/5 to return to their PLOF. (Progressing, not met)  5.) Pt will tolerate walking a quarter of a mile with no increase in left knee pain to return to walking dogs. (Progressing, not met)     PLAN     Plan of care Certification: 4/5/2023 to 6/5/2023.    Focus on LE strength, knee ROM and ADL performance      Outpatient Physical Therapy 2 times weekly for 4 weeks to include the following interventions: Therapeutic Exercises, Manual Therapeutic Technique, Neuromuscular Re Education, Therapeutic Activities. Modalities, Kinesiotape prn, and Functional Dry Needling as needed.    Lucian Mon, PTA

## 2023-04-20 ENCOUNTER — CLINICAL SUPPORT (OUTPATIENT)
Dept: REHABILITATION | Facility: OTHER | Age: 58
End: 2023-04-20
Payer: MEDICAID

## 2023-04-20 DIAGNOSIS — R29.898 WEAKNESS OF BOTH LOWER EXTREMITIES: Primary | ICD-10-CM

## 2023-04-20 PROCEDURE — 97110 THERAPEUTIC EXERCISES: CPT | Mod: PN,CQ

## 2023-04-25 ENCOUNTER — CLINICAL SUPPORT (OUTPATIENT)
Dept: REHABILITATION | Facility: OTHER | Age: 58
End: 2023-04-25
Payer: MEDICAID

## 2023-04-25 DIAGNOSIS — R29.898 WEAKNESS OF BOTH LOWER EXTREMITIES: Primary | ICD-10-CM

## 2023-04-25 PROCEDURE — 97110 THERAPEUTIC EXERCISES: CPT | Mod: PN

## 2023-04-25 NOTE — PROGRESS NOTES
"    [facility]   Physical Therapy Treatment Note     Name: Mannie Steel [facility]  Clinic Number: [id number]    Therapy Diagnosis:   Encounter Diagnosis   Name Primary?    Weakness of both lower extremities Yes         Physician: Glo Jaime MD    Visit Date: 4/25/2023    Physician Orders: Physical Therapy Evaluate and Treat  Medical Diagnosis from Referral: Bilateral primary osteoarthritis of knee [M17.0]  Evaluation Date: 4/5/2023  Authorization Period Expiration: 3/16/2024  Plan of Care Expiration: 4/5/2023 to 6/5/2023  Visit # / Visits authorized: 5/20   FOTO: #1/3 on 4/5/23      Time In: 1055a  Time Out: 1130a  Total Billable Time: 30 minutes    SUBJECTIVE     Pt reports: he continues to have right hip soreness after treatment session where we increased reps on SL hip abduction.  He was compliant with home exercise program.  Response to previous treatment: felt sore  Functional change: n/a    Pain:  Current 4/10, worst 7/10, best 3/10   Location: bilateral knee   Description: Aching and Dull  Aggravating Factors: Standing and Walking  Easing Factors: lying down and rest     Pts goals: Pt would like to return to hiking with no increase in bilateral knee pain.    OBJECTIVE     Objective Measures updated at progress report unless specified.     Treatment     Damián received the treatments listed below in bold:      therapeutic exercises to develop strength, endurance, ROM, and core stabilization for  30 minutes including:    Recumbent bike L4 5 minutes  Straight leg bridges on blue ball, 3" hold, 20x  Supine knee extension props 3#  R/L Supine hamstring stretch with strap 3x30"  R/L SLR 2# 2x20 R/L  R/L SL hip abduction 2# 1x20  Prone hip extension 2x20  Prone knee flexion with strap 3x30"  Prone femoral nerve glide x20  Shuttle squats 75# 2x20  SL shuttle squats 50# 2x20 R/L  Step ups 6"  20x R/L  TKE with red power band, 30x R/L  LAQ 3# 30x R/L      manual therapy techniques: Joint " "mobilizations, Myofacial release, and Soft tissue Mobilization were applied to the: bilateral knees for 00 minutes, including:    Patellar mobilization    neuromuscular re-education activities to improve: Balance, Coordination, Kinesthetic, Sense, Proprioception, and Posture for 00 minutes. The following activities were included:    SLS with ball toss forward/90 degrees each side x10 mins    Patient Education and Home Exercises     Home Exercises and Patient Education Provided:     Education provided:   - Exercise form and rationale      Written Home Exercises Provided: Patient was instructed to cont with previous HEP  Exercises were reviewed and Damián was able to demonstrate them prior to the end of the session.  Damián demonstrated good understanding of the education provided.       ASSESSMENT     Pt tolerated therapeutic interventions well with no complaint of increased pain. Patient reported relief after physical therapy treatment today. He continues to have mild weakness with right hip abduction which is notable with single leg therapeutic exercise. He reports "centralization" of symptoms to lateral aspect of right hip and denies increased "radiating" pain on right LE. Patient will continue to benefit from skilled physical therapy to address persisting deficits.      Damián Is progressing well towards his goals.   Pt prognosis is Excellent.     Pt will continue to benefit from skilled outpatient physical therapy to address the deficits listed in the problem list box on initial evaluation, provide pt/family education and to maximize pt's level of independence in the home and community environment.     Pt's spiritual, cultural and educational needs considered and pt agreeable to plan of care and goals.     Anticipated barriers to physical therapy: NA    Goals: GOALS:  Short Term Goals:     1.) Pt will improve their FOTO score by 5% to return to PLOF. (Progressing, not met)  2.) Pt will decrease their left knee pain " to 4/10 for improved QOL. (Progressing, not met)  3.) Pt will improve their right knee flexion AROM to 125 degrees for improved community ambulation. (Progressing, not met)  4.) Pt will improve their hip abduction strength to 4+/5 to return to their PLOF. (Progressing, not met)  5.) Pt will become independent with their HEP to improve strength and tolerance to functional activities. (Progressing, not met)     Long Term Goals:  1.) Pt will improve their FOTO score by 10% to return to PLOF. (Progressing, not met)  2.) Pt will decrease their left knee pain to 2/10 for improved QOL. (Progressing, not met)  3.) Pt will improve their left knee flexion AROM to 130 degrees for improved hiking. (Progressing, not met)  4.) Pt will improve their hip flexion strength to 5/5 to return to their PLOF. (Progressing, not met)  5.) Pt will tolerate walking a quarter of a mile with no increase in left knee pain to return to walking dogs. (Progressing, not met)     PLAN     Plan of care Certification: 4/5/2023 to 6/5/2023.    Focus on LE strength, knee ROM and ADL performance      Outpatient Physical Therapy 2 times weekly for 4 weeks to include the following interventions: Therapeutic Exercises, Manual Therapeutic Technique, Neuromuscular Re Education, Therapeutic Activities. Modalities, Kinesiotape prn, and Functional Dry Needling as needed.    Heri Pink, PT

## 2023-04-27 ENCOUNTER — CLINICAL SUPPORT (OUTPATIENT)
Dept: REHABILITATION | Facility: OTHER | Age: 58
End: 2023-04-27
Payer: MEDICAID

## 2023-04-27 DIAGNOSIS — R29.898 WEAKNESS OF BOTH LOWER EXTREMITIES: Primary | ICD-10-CM

## 2023-04-27 PROCEDURE — 97110 THERAPEUTIC EXERCISES: CPT | Mod: PN

## 2023-04-27 NOTE — PROGRESS NOTES
"    [facility]   Physical Therapy Treatment Note     Name: Mannie Steel [facility]  Clinic Number: [id number]    Therapy Diagnosis:   Encounter Diagnosis   Name Primary?    Weakness of both lower extremities Yes         Physician: Glo Jaime MD    Visit Date: 4/27/2023    Physician Orders: Physical Therapy Evaluate and Treat  Medical Diagnosis from Referral: Bilateral primary osteoarthritis of knee [M17.0]  Evaluation Date: 4/5/2023  Authorization Period Expiration: 3/16/2024  Plan of Care Expiration: 4/5/2023 to 6/5/2023  Visit # / Visits authorized: 5/20   FOTO: #1/3 on 4/5/23      Time In: 1100am  Time Out: 1200pm  Total Billable Time: 60 minutes    SUBJECTIVE     Pt reports: that he is doing well today. "My legs are hurting less compared to my last few sessions."  He was compliant with home exercise program.  Response to previous treatment: felt sore  Functional change: n/a    Pain:  Current 4/10, worst 7/10, best 3/10   Location: bilateral knee   Description: Aching and Dull  Aggravating Factors: Standing and Walking  Easing Factors: lying down and rest     Pts goals: Pt would like to return to hiking with no increase in bilateral knee pain.    OBJECTIVE     Objective Measures updated at progress report unless specified.     Treatment     Damián received the treatments listed below in bold:      therapeutic exercises to develop strength, endurance, ROM, and core stabilization for  60 minutes including:    Recumbent bike L4 5 minutes  Straight leg bridges on blue ball, 3" hold, 20x  Supine knee extension props 3#  R/L Supine hamstring stretch with strap 3x30"  R/L SLR 2# 2x20 R/L  R/L SL hip abduction 2# 1x20  Prone hip extension 2x20  Prone knee flexion with strap 3x30"  Prone femoral nerve glide x20  Shuttle squats 75# 2x20  SL shuttle squats 50# 2x20 R/L  Step ups 6"  20x R/L  TKE with red power band, 30x R/L  LAQ 3# 30x R/L  +Sit back squats with chair as tactile cue 3x 10 " repetitions  +Hesitation marches vs 15# KB 2 laps on turf      manual therapy techniques: Joint mobilizations, Myofacial release, and Soft tissue Mobilization were applied to the: bilateral knees for 00 minutes, including:    Patellar mobilization    neuromuscular re-education activities to improve: Balance, Coordination, Kinesthetic, Sense, Proprioception, and Posture for 00 minutes. The following activities were included:    SLS with ball toss forward/90 degrees each side x10 mins    Patient Education and Home Exercises     Home Exercises and Patient Education Provided:     Education provided:   - Exercise form and rationale      Written Home Exercises Provided: Patient was instructed to cont with previous HEP  Exercises were reviewed and Damián was able to demonstrate them prior to the end of the session.  Damián demonstrated good understanding of the education provided.       ASSESSMENT     Pt tolerated tx session well today and completed all exercises with no increase in leg pain. Pt continues to progress with B LE strength. Continue PT POC.      Damián Is progressing well towards his goals.   Pt prognosis is Excellent.     Pt will continue to benefit from skilled outpatient physical therapy to address the deficits listed in the problem list box on initial evaluation, provide pt/family education and to maximize pt's level of independence in the home and community environment.     Pt's spiritual, cultural and educational needs considered and pt agreeable to plan of care and goals.     Anticipated barriers to physical therapy: NA    Goals: GOALS:  Short Term Goals:     1.) Pt will improve their FOTO score by 5% to return to PLOF. (Progressing, not met)  2.) Pt will decrease their left knee pain to 4/10 for improved QOL. (Progressing, not met)  3.) Pt will improve their right knee flexion AROM to 125 degrees for improved community ambulation. (Progressing, not met)  4.) Pt will improve their hip abduction strength to  4+/5 to return to their PLOF. (Progressing, not met)  5.) Pt will become independent with their HEP to improve strength and tolerance to functional activities. (Progressing, not met)     Long Term Goals:  1.) Pt will improve their FOTO score by 10% to return to PLOF. (Progressing, not met)  2.) Pt will decrease their left knee pain to 2/10 for improved QOL. (Progressing, not met)  3.) Pt will improve their left knee flexion AROM to 130 degrees for improved hiking. (Progressing, not met)  4.) Pt will improve their hip flexion strength to 5/5 to return to their PLOF. (Progressing, not met)  5.) Pt will tolerate walking a quarter of a mile with no increase in left knee pain to return to walking dogs. (Progressing, not met)     PLAN     Plan of care Certification: 4/5/2023 to 6/5/2023.    Focus on LE strength, knee ROM and ADL performance      Outpatient Physical Therapy 2 times weekly for 4 weeks to include the following interventions: Therapeutic Exercises, Manual Therapeutic Technique, Neuromuscular Re Education, Therapeutic Activities. Modalities, Kinesiotape prn, and Functional Dry Needling as needed.    Nadine Nix, PT

## 2023-05-02 ENCOUNTER — CLINICAL SUPPORT (OUTPATIENT)
Dept: REHABILITATION | Facility: OTHER | Age: 58
End: 2023-05-02
Payer: MEDICAID

## 2023-05-02 ENCOUNTER — DOCUMENTATION ONLY (OUTPATIENT)
Dept: REHABILITATION | Facility: OTHER | Age: 58
End: 2023-05-02

## 2023-05-02 DIAGNOSIS — R29.898 WEAKNESS OF BOTH LOWER EXTREMITIES: Primary | ICD-10-CM

## 2023-05-02 PROCEDURE — 97110 THERAPEUTIC EXERCISES: CPT | Mod: PN,CQ
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
## 2023-05-04 ENCOUNTER — CLINICAL SUPPORT (OUTPATIENT)
Dept: REHABILITATION | Facility: OTHER | Age: 58
End: 2023-05-04
Payer: MEDICAID

## 2023-05-04 DIAGNOSIS — R29.898 WEAKNESS OF BOTH LOWER EXTREMITIES: Primary | ICD-10-CM

## 2023-05-04 PROCEDURE — 97110 THERAPEUTIC EXERCISES: CPT | Mod: PN,CQ

## 2023-05-04 NOTE — PROGRESS NOTES
"    [facility]   Physical Therapy Treatment Note     Name: Mannie Steel [facility]  Clinic Number: [id number]    Therapy Diagnosis:   Encounter Diagnosis   Name Primary?    Weakness of both lower extremities Yes         Physician: Glo Jaime MD    Visit Date: 5/4/2023    Physician Orders: Physical Therapy Evaluate and Treat  Medical Diagnosis from Referral: Bilateral primary osteoarthritis of knee [M17.0]  Evaluation Date: 4/5/2023  Authorization Period Expiration: 3/16/2024  Plan of Care Expiration: 4/5/2023 to 6/5/2023  Visit # / Visits authorized: 8/12  FOTO: #1/3 on 4/5/23      Time In: 0957  Time Out: 1105  Total Billable Time: 38 minutes    SUBJECTIVE   Pt reports: he has been feeling well overall. States he has been on his feet a good bit and has not been having as much pain.     He was compliant with home exercise program.  Response to previous treatment: felt sore  Functional change: slightly decreased pain    Pain:  Current 2/10, worst 7/10, best 3/10   Location: bilateral knee   Description: Aching and Dull  Aggravating Factors: Standing and Walking  Easing Factors: lying down and rest     Pts goals: Pt would like to return to hiking with no increase in bilateral knee pain.    OBJECTIVE     Objective Measures updated at progress report unless specified.     Treatment     Damián received the treatments listed below in bold:      therapeutic exercises to develop strength, endurance, ROM, and core stabilization for  38 minutes including:    Recumbent bike L4 8 minutes- for strength and endurance  Straight leg bridges on blue ball, 3" hold, 20x  Supine knee extension props 3#  R/L Supine hamstring stretch with strap 3x30"  R/L SLR 2# 2x20 R/L  R/L SL hip abduction 2x20  Prone hip extension 2x20  Prone knee flexion with strap 3x30"  Prone femoral nerve glide x20  Shuttle squats 75# 2x20  SL shuttle squats 50# 2x20 R/L  Step ups 6"  20x R/L  TKE with red power band, 30x R/L  LAQ 3# " 30x R/L  Sit back squats with chair as tactile cue 3x 10 repetitions  (2 foam pads in chair)  Hesitation marches vs 15# KB 2 laps on turf      manual therapy techniques: Joint mobilizations, Myofacial release, and Soft tissue Mobilization were applied to the: bilateral knees for 00 minutes, including:    Patellar mobilization    neuromuscular re-education activities to improve: Balance, Coordination, Kinesthetic, Sense, Proprioception, and Posture for 00 minutes. The following activities were included:    SLS with ball toss forward/90 degrees each side x10 mins    Patient Education and Home Exercises     Home Exercises and Patient Education Provided:     Education provided:   - Exercise form and rationale      Written Home Exercises Provided: Patient was instructed to cont with previous HEP  Exercises were reviewed and Damián was able to demonstrate them prior to the end of the session.  Damián demonstrated good understanding of the education provided.     ASSESSMENT   Damián appears to be making progress in PT however, limitations due to pain /weakness continue to be noted in B LE. Slightly improved eccentric control was noted with functional sit to stand as muscular endurance is showing signs of improvement. Will continue to progress functional /ADL related exercises as tolerated.       Damián Is progressing well towards his goals.   Pt prognosis is Excellent.     Pt will continue to benefit from skilled outpatient physical therapy to address the deficits listed in the problem list box on initial evaluation, provide pt/family education and to maximize pt's level of independence in the home and community environment.     Pt's spiritual, cultural and educational needs considered and pt agreeable to plan of care and goals.     Anticipated barriers to physical therapy: NA    Goals: GOALS:  Short Term Goals:     1.) Pt will improve their FOTO score by 5% to return to PLOF. (Progressing, not met)  2.) Pt will decrease their  left knee pain to 4/10 for improved QOL. (Progressing, not met)  3.) Pt will improve their right knee flexion AROM to 125 degrees for improved community ambulation. (Progressing, not met)  4.) Pt will improve their hip abduction strength to 4+/5 to return to their PLOF. (Progressing, not met)  5.) Pt will become independent with their HEP to improve strength and tolerance to functional activities. (Progressing, not met)     Long Term Goals:  1.) Pt will improve their FOTO score by 10% to return to PLOF. (Progressing, not met)  2.) Pt will decrease their left knee pain to 2/10 for improved QOL. (Progressing, not met)  3.) Pt will improve their left knee flexion AROM to 130 degrees for improved hiking. (Progressing, not met)  4.) Pt will improve their hip flexion strength to 5/5 to return to their PLOF. (Progressing, not met)  5.) Pt will tolerate walking a quarter of a mile with no increase in left knee pain to return to walking dogs. (Progressing, not met)     PLAN   Plan of care Certification: 4/5/2023 to 6/5/2023.    Focus on LE strength, knee ROM and ADL performance      Outpatient Physical Therapy 2 times weekly for 4 weeks to include the following interventions: Therapeutic Exercises, Manual Therapeutic Technique, Neuromuscular Re Education, Therapeutic Activities. Modalities, Kinesiotape prn, and Functional Dry Needling as needed.    Lucian Mon, PTA

## 2023-05-09 ENCOUNTER — CLINICAL SUPPORT (OUTPATIENT)
Dept: REHABILITATION | Facility: OTHER | Age: 58
End: 2023-05-09
Payer: MEDICAID

## 2023-05-09 DIAGNOSIS — R29.898 WEAKNESS OF BOTH LOWER EXTREMITIES: Primary | ICD-10-CM

## 2023-05-09 PROCEDURE — 97110 THERAPEUTIC EXERCISES: CPT | Mod: PN,CQ

## 2023-05-09 NOTE — PROGRESS NOTES
"    [facility]   Physical Therapy Treatment Note     Name: Mannie Steel [facility]  Clinic Number: [id number]    Therapy Diagnosis:   Encounter Diagnosis   Name Primary?    Weakness of both lower extremities Yes           Physician: Glo Jaime MD    Visit Date: 5/9/2023    Physician Orders: Physical Therapy Evaluate and Treat  Medical Diagnosis from Referral: Bilateral primary osteoarthritis of knee [M17.0]  Evaluation Date: 4/5/2023  Authorization Period Expiration: 3/16/2024  Plan of Care Expiration: 4/5/2023 to 6/5/2023  Visit # / Visits authorized: 10/12  FOTO: #1/3 on 4/5/23      Time In: 1030  Time Out: 1124  Total Billable Time: 38 minutes    SUBJECTIVE   Pt reports: he has been feeling well today with just minimal pain in his knees. Feels like therapy is helping.     He was compliant with home exercise program.  Response to previous treatment: felt fine, no issues  Functional change: slightly decreased pain    Pain:  Current 2/10, worst 7/10, best 3/10   Location: bilateral knee   Description: Aching and Dull  Aggravating Factors: Standing and Walking  Easing Factors: lying down and rest     Pts goals: Pt would like to return to hiking with no increase in bilateral knee pain.    OBJECTIVE     Objective Measures updated at progress report unless specified.     Treatment     Damián received the treatments listed below in bold:      therapeutic exercises to develop strength, endurance, ROM, and core stabilization for  38 minutes including:    Recumbent bike L4 8 minutes- for strength and endurance  Straight leg bridges on blue ball, 3" hold, 20x  Supine knee extension props 3#  R/L Supine hamstring stretch with strap 3x30"  R/L SLR 3# 2x15 R/L  R/L SL hip abduction 3# 2x10  Prone hip extension 3# 2x10  Prone knee flexion with strap 3x30"  Prone femoral nerve glide x20  Shuttle squats 100# 2x20  SL shuttle squats 50# 2x20 R/L  Step ups 6"  20x R/L  TKE with red power band, 30x " "R/L  LAQ 5# 30x R/L  Sit to stand from chair, 2x15  Hesitation marches vs 15# KB 80ftx2 on turf  +Step ups on 8" step 30x R/L      manual therapy techniques: Joint mobilizations, Myofacial release, and Soft tissue Mobilization were applied to the: bilateral knees for 00 minutes, including:    Patellar mobilization    neuromuscular re-education activities to improve: Balance, Coordination, Kinesthetic, Sense, Proprioception, and Posture for 00 minutes. The following activities were included:    SLS with ball toss forward/90 degrees each side x10 mins    Patient Education and Home Exercises     Home Exercises and Patient Education Provided:     Education provided:   - Exercise form and rationale      Written Home Exercises Provided: Patient was instructed to cont with previous HEP  Exercises were reviewed and Damián was able to demonstrate them prior to the end of the session.  Damián demonstrated good understanding of the education provided.     ASSESSMENT   Damián was able to demonstrated increased resistance with hip/knee exercises as demonstrated with leg press and sit to stand as seat surface was lowered today. Resistance was also increased with straight plane hip exercises with improved endurance/musculare control being noted. Verbal cuing for eccentrics for improved musculare control.       Damián Is progressing well towards his goals.   Pt prognosis is Excellent.     Pt will continue to benefit from skilled outpatient physical therapy to address the deficits listed in the problem list box on initial evaluation, provide pt/family education and to maximize pt's level of independence in the home and community environment.     Pt's spiritual, cultural and educational needs considered and pt agreeable to plan of care and goals.     Anticipated barriers to physical therapy: NA    Goals: GOALS:  Short Term Goals:     1.) Pt will improve their FOTO score by 5% to return to PLOF. (Progressing, not met)  2.) Pt will " decrease their left knee pain to 4/10 for improved QOL. (Progressing, not met)  3.) Pt will improve their right knee flexion AROM to 125 degrees for improved community ambulation. (Progressing, not met)  4.) Pt will improve their hip abduction strength to 4+/5 to return to their PLOF. (Progressing, not met)  5.) Pt will become independent with their HEP to improve strength and tolerance to functional activities. (Progressing, not met)     Long Term Goals:  1.) Pt will improve their FOTO score by 10% to return to PLOF. (Progressing, not met)  2.) Pt will decrease their left knee pain to 2/10 for improved QOL. (Progressing, not met)  3.) Pt will improve their left knee flexion AROM to 130 degrees for improved hiking. (Progressing, not met)  4.) Pt will improve their hip flexion strength to 5/5 to return to their PLOF. (Progressing, not met)  5.) Pt will tolerate walking a quarter of a mile with no increase in left knee pain to return to walking dogs. (Progressing, not met)     PLAN   Plan of care Certification: 4/5/2023 to 6/5/2023.    Focus on LE strength, knee ROM and ADL performance      Outpatient Physical Therapy 2 times weekly for 4 weeks to include the following interventions: Therapeutic Exercises, Manual Therapeutic Technique, Neuromuscular Re Education, Therapeutic Activities. Modalities, Kinesiotape prn, and Functional Dry Needling as needed.    Lucian Mon, PTA

## 2023-05-11 ENCOUNTER — CLINICAL SUPPORT (OUTPATIENT)
Dept: REHABILITATION | Facility: OTHER | Age: 58
End: 2023-05-11
Payer: MEDICAID

## 2023-05-11 DIAGNOSIS — R29.898 WEAKNESS OF BOTH LOWER EXTREMITIES: Primary | ICD-10-CM

## 2023-05-11 PROCEDURE — 97110 THERAPEUTIC EXERCISES: CPT | Mod: PN

## 2023-05-11 NOTE — PLAN OF CARE
[facility]   Physical Therapy Updated Plan of Care    Name: Mannie Guerrero  Clinic Number: [id number]    Therapy Diagnosis:   Encounter Diagnosis   Name Primary?    Weakness of both lower extremities Yes         Physician: Glo Jaime MD    Visit Date: 5/11/2023    Physician Orders: Physical Therapy Evaluate and Treat  Medical Diagnosis from Referral: Bilateral primary osteoarthritis of knee [M17.0]  Evaluation Date: 4/5/2023  Authorization Period Expiration: 5/22/23  Plan of Care Expiration: 5/11/23- 7/11/23  Visit # / Visits authorized: 11/12      Time In: 0900am  Time Out: 1000am  Total Billable Time: 60 minutes    SUBJECTIVE   Pt reports: Pt reports that he is having less pain while standing up from a low surface. Pt has a little bit of low back pain today. Pt states that therapy is helping and he is doing better overall.     He was compliant with home exercise program.  Response to previous treatment: felt fine, no issues  Functional change: slightly decreased pain    Pain:  Current 2/10, worst 7/10, best 3/10   Location: bilateral knee   Description: Aching and Dull  Aggravating Factors: Standing and Walking  Easing Factors: lying down and rest     Pts goals: Pt would like to return to hiking with no increase in bilateral knee pain.    OBJECTIVE        Range of Motion:   Knee Left active Left Passive Right Active R passive   Flexion 120 121 122 122   Extension -3 NT -2 NT               Lower Extremity Strength  Right LE   Left LE     Knee extension: 4+/5 Knee extension: 4+/5   Knee flexion: 4/5 Knee flexion: 4/5   Hip flexion: 4+/5 Hip flexion: 4+/5   Hip abduction: 4/5 Hip abduction: 4/5   Hip adduction: 4/5 Hip adduction 4/5                CMS Impairment/Limitation/Restriction for FOTO Survey     Therapist reviewed FOTO scores for Mannie Guerrero on 5/11/23  FOTO documents entered into [employer] - see Media section.     Limitation Score: 41%  Predicted Goal: 33%    "  Category: Mobility        Treatment     Damián received the treatments listed below in bold:      therapeutic exercises to develop strength, endurance, ROM, and core stabilization for  38 minutes including:    Recumbent bike L4 8 minutes- for strength and endurance  Straight leg bridges on blue ball, 3" hold, 20x  Supine knee extension props 3#  R/L Supine hamstring stretch with strap 3x30"  R/L SLR 3# 2x15 R/L  R/L SL hip abduction 3# 2x10  Prone hip extension 3# 2x10  Prone knee flexion with strap 3x30"  Prone femoral nerve glide x20  Shuttle squats 100# 2x20  SL shuttle squats 50# 2x20 R/L  TKE with red power band, 30x R/L  LAQ 5# 30x R/L  Sit to stand from chair, 2x15  Hesitation marches vs 15# KB 80ftx2 on turf  +Step ups on 8" step 30x R/L      manual therapy techniques: Joint mobilizations, Myofacial release, and Soft tissue Mobilization were applied to the: bilateral knees for 00 minutes, including:    Patellar mobilization    neuromuscular re-education activities to improve: Balance, Coordination, Kinesthetic, Sense, Proprioception, and Posture for 00 minutes. The following activities were included:    SLS with ball toss forward/90 degrees each side x10 mins    Patient Education and Home Exercises     Home Exercises and Patient Education Provided:     Education provided:   - Exercise form and rationale      Written Home Exercises Provided: Patient was instructed to cont with previous HEP  Exercises were reviewed and Damián was able to demonstrate them prior to the end of the session.  Damián demonstrated good understanding of the education provided.     ASSESSMENT   Pt presents to physical therapy treatment with decreased active range of motion, decreased strength, poor posture, and increased pain due to bilateral knee and low back impairments. These factors are negatively impacting the patient's ability to complete their activities of daily living and work duties. Pt is progressing towards their short and " long term goals as evidenced by decreased pain, improved strength and range of motion, and improved FOTO score. These goals remain appropriate for the plan of care. Pt would benefit from continued skilled physical therapy treatment to address these deficits so that they may return to their prior level of function with improved quality of life.        Damián Is progressing well towards his goals.   Pt prognosis is Excellent.     Pt will continue to benefit from skilled outpatient physical therapy to address the deficits listed in the problem list box on initial evaluation, provide pt/family education and to maximize pt's level of independence in the home and community environment.     Pt's spiritual, cultural and educational needs considered and pt agreeable to plan of care and goals.     Anticipated barriers to physical therapy: NA    Goals: GOALS:  Short Term Goals:     1.) Pt will improve their FOTO score by 5% to return to PLOF. (Progressing, not met)  2.) Pt will decrease their left knee pain to 4/10 for improved QOL. (met)  3.) Pt will improve their right knee flexion AROM to 125 degrees for improved community ambulation. (Progressing, not met)  4.) Pt will improve their hip abduction strength to 4+/5 to return to their PLOF. ( met)  5.) Pt will become independent with their HEP to improve strength and tolerance to functional activities. (met)     Long Term Goals:  1.) Pt will improve their FOTO score by 10% to return to PLOF. (Progressing, not met)  2.) Pt will decrease their left knee pain to 2/10 for improved QOL. (Progressing, not met)  3.) Pt will improve their left knee flexion AROM to 130 degrees for improved hiking. (Progressing, not met)  4.) Pt will improve their hip flexion strength to 5/5 to return to their PLOF. (Progressing, not met)  5.) Pt will tolerate walking a quarter of a mile with no increase in left knee pain to return to walking dogs. (Progressing, not met)     PLAN   Plan of care  Certification: 5/11/23- 7/11/23    Focus on LE strength, knee ROM and ADL performance      Outpatient Physical Therapy 2 times weekly for 4 weeks to include the following interventions: Therapeutic Exercises, Manual Therapeutic Technique, Neuromuscular Re Education, Therapeutic Activities. Modalities, Kinesiotape prn, and Functional Dry Needling as needed.    Nadine Nix, PT

## 2023-05-11 NOTE — PROGRESS NOTES
[facility]   Physical Therapy Updated Plan of Care    Name: Mannie Guerrero  Clinic Number: [id number]    Therapy Diagnosis:   Encounter Diagnosis   Name Primary?    Weakness of both lower extremities Yes         Physician: Glo Jaime MD    Visit Date: 5/11/2023    Physician Orders: Physical Therapy Evaluate and Treat  Medical Diagnosis from Referral: Bilateral primary osteoarthritis of knee [M17.0]  Evaluation Date: 4/5/2023  Authorization Period Expiration: 5/22/23  Plan of Care Expiration: 5/11/23- 7/11/23  Visit # / Visits authorized: 11/12      Time In: 0900am  Time Out: 1000am  Total Billable Time: 60 minutes    SUBJECTIVE   Pt reports: Pt reports that he is having less pain while standing up from a low surface. Pt has a little bit of low back pain today. Pt states that therapy is helping and he is doing better overall.     He was compliant with home exercise program.  Response to previous treatment: felt fine, no issues  Functional change: slightly decreased pain    Pain:  Current 2/10, worst 7/10, best 3/10   Location: bilateral knee   Description: Aching and Dull  Aggravating Factors: Standing and Walking  Easing Factors: lying down and rest     Pts goals: Pt would like to return to hiking with no increase in bilateral knee pain.    OBJECTIVE        Range of Motion:   Knee Left active Left Passive Right Active R passive   Flexion 120 121 122 122   Extension -3 NT -2 NT               Lower Extremity Strength  Right LE   Left LE     Knee extension: 4+/5 Knee extension: 4+/5   Knee flexion: 4/5 Knee flexion: 4/5   Hip flexion: 4+/5 Hip flexion: 4+/5   Hip abduction: 4/5 Hip abduction: 4/5   Hip adduction: 4/5 Hip adduction 4/5                CMS Impairment/Limitation/Restriction for FOTO Survey     Therapist reviewed FOTO scores for Mannie Guerrero on 5/11/23  FOTO documents entered into [employer] - see Media section.     Limitation Score: 41%  Predicted Goal: 33%    "  Category: Mobility        Treatment     Damián received the treatments listed below in bold:      therapeutic exercises to develop strength, endurance, ROM, and core stabilization for  38 minutes including:    Recumbent bike L4 8 minutes- for strength and endurance  Straight leg bridges on blue ball, 3" hold, 20x  Supine knee extension props 3#  R/L Supine hamstring stretch with strap 3x30"  R/L SLR 3# 2x15 R/L  R/L SL hip abduction 3# 2x10  Prone hip extension 3# 2x10  Prone knee flexion with strap 3x30"  Prone femoral nerve glide x20  Shuttle squats 100# 2x20  SL shuttle squats 50# 2x20 R/L  TKE with red power band, 30x R/L  LAQ 5# 30x R/L  Sit to stand from chair, 2x15  Hesitation marches vs 15# KB 80ftx2 on turf  +Step ups on 8" step 30x R/L      manual therapy techniques: Joint mobilizations, Myofacial release, and Soft tissue Mobilization were applied to the: bilateral knees for 00 minutes, including:    Patellar mobilization    neuromuscular re-education activities to improve: Balance, Coordination, Kinesthetic, Sense, Proprioception, and Posture for 00 minutes. The following activities were included:    SLS with ball toss forward/90 degrees each side x10 mins    Patient Education and Home Exercises     Home Exercises and Patient Education Provided:     Education provided:   - Exercise form and rationale      Written Home Exercises Provided: Patient was instructed to cont with previous HEP  Exercises were reviewed and Damián was able to demonstrate them prior to the end of the session.  Damián demonstrated good understanding of the education provided.     ASSESSMENT   Pt presents to physical therapy treatment with decreased active range of motion, decreased strength, poor posture, and increased pain due to bilateral knee and low back impairments. These factors are negatively impacting the patient's ability to complete their activities of daily living and work duties. Pt is progressing towards their short and " long term goals as evidenced by decreased pain, improved strength and range of motion, and improved FOTO score. These goals remain appropriate for the plan of care. Pt would benefit from continued skilled physical therapy treatment to address these deficits so that they may return to their prior level of function with improved quality of life.        Damián Is progressing well towards his goals.   Pt prognosis is Excellent.     Pt will continue to benefit from skilled outpatient physical therapy to address the deficits listed in the problem list box on initial evaluation, provide pt/family education and to maximize pt's level of independence in the home and community environment.     Pt's spiritual, cultural and educational needs considered and pt agreeable to plan of care and goals.     Anticipated barriers to physical therapy: NA    Goals: GOALS:  Short Term Goals:     1.) Pt will improve their FOTO score by 5% to return to PLOF. (Progressing, not met)  2.) Pt will decrease their left knee pain to 4/10 for improved QOL. (met)  3.) Pt will improve their right knee flexion AROM to 125 degrees for improved community ambulation. (Progressing, not met)  4.) Pt will improve their hip abduction strength to 4+/5 to return to their PLOF. ( met)  5.) Pt will become independent with their HEP to improve strength and tolerance to functional activities. (met)     Long Term Goals:  1.) Pt will improve their FOTO score by 10% to return to PLOF. (Progressing, not met)  2.) Pt will decrease their left knee pain to 2/10 for improved QOL. (Progressing, not met)  3.) Pt will improve their left knee flexion AROM to 130 degrees for improved hiking. (Progressing, not met)  4.) Pt will improve their hip flexion strength to 5/5 to return to their PLOF. (Progressing, not met)  5.) Pt will tolerate walking a quarter of a mile with no increase in left knee pain to return to walking dogs. (Progressing, not met)     PLAN   Plan of care  Certification: 5/11/23- 7/11/23    Focus on LE strength, knee ROM and ADL performance      Outpatient Physical Therapy 2 times weekly for 4 weeks to include the following interventions: Therapeutic Exercises, Manual Therapeutic Technique, Neuromuscular Re Education, Therapeutic Activities. Modalities, Kinesiotape prn, and Functional Dry Needling as needed.    Nadine Nix, PT

## 2023-05-16 ENCOUNTER — CLINICAL SUPPORT (OUTPATIENT)
Dept: REHABILITATION | Facility: OTHER | Age: 58
End: 2023-05-16
Payer: MEDICAID

## 2023-05-16 DIAGNOSIS — R29.898 WEAKNESS OF BOTH LOWER EXTREMITIES: Primary | ICD-10-CM

## 2023-05-16 PROCEDURE — 97110 THERAPEUTIC EXERCISES: CPT | Mod: PN,CQ

## 2023-05-16 NOTE — PROGRESS NOTES
"    [facility]   Physical Therapy Daily Treatment Note    Name: Mannie Guerrero  Clinic Number: [id number]    Therapy Diagnosis:   Encounter Diagnosis   Name Primary?    Weakness of both lower extremities Yes       Physician: Glo Jaime MD    Visit Date: 5/16/2023    Physician Orders: Physical Therapy Evaluate and Treat  Medical Diagnosis from Referral: Bilateral primary osteoarthritis of knee [M17.0]  Evaluation Date: 4/5/2023  Authorization Period Expiration: 5/22/23  Plan of Care Expiration: 5/11/23- 7/11/23  Visit # / Visits authorized: 12/12      Time In: 1000  Time Out: 1053  Total Billable Time: 53 minutes    SUBJECTIVE   Pt reports: he is still having some sharp pain in his L low back. States he feels like it will "bite" him at times if he moves wrong.     He was compliant with home exercise program.  Response to previous treatment: felt fine, no issues  Functional change: slightly decreased pain    Pain:  Current 2/10, worst 7/10, best 3/10   Location: bilateral knee   Description: Aching and Dull  Aggravating Factors: Standing and Walking  Easing Factors: lying down and rest     Pts goals: Pt would like to return to hiking with no increase in bilateral knee pain.    OBJECTIVE        Range of Motion:   Knee Left active Left Passive Right Active R passive   Flexion 120 121 122 122   Extension -3 NT -2 NT               Lower Extremity Strength  Right LE   Left LE     Knee extension: 4+/5 Knee extension: 4+/5   Knee flexion: 4/5 Knee flexion: 4/5   Hip flexion: 4+/5 Hip flexion: 4+/5   Hip abduction: 4/5 Hip abduction: 4/5   Hip adduction: 4/5 Hip adduction 4/5                CMS Impairment/Limitation/Restriction for FOTO Survey     Therapist reviewed FOTO scores for Mannie Guerrero on 5/11/23  FOTO documents entered into [employer] - see Media section.     Limitation Score: 41%  Predicted Goal: 33%     Category: Mobility        Treatment     Damián received the treatments " "listed below in bold:      therapeutic exercises to develop strength, endurance, ROM, and core stabilization for  53 minutes including:    Recumbent bike L4 8 minutes- for strength and endurance  Straight leg bridges on blue ball, 3" hold, 20x  Supine knee extension props 3#  R/L Supine hamstring stretch with strap 3x30"  R/L SLR 3# 2x10 R/L  - no weights today  R/L SL hip abduction 3# 2x10 - no weights today  Prone hip extension 3# 2x10  - no weights today  Prone knee flexion with strap 3x30"  Prone femoral nerve glide x20  Shuttle squats 100# 2x20  SL shuttle squats 50# 2x20 R/L  TKE with red power band, 30x R/L  LAQ 5# 30x R/L  Sit to stand from chair, 2x15 + 15# KB  Hesitation marches vs 15# KB 80ftx2 on turf  Step ups on 8" step + 15# KB 20x R/L      manual therapy techniques: Joint mobilizations, Myofacial release, and Soft tissue Mobilization were applied to the: bilateral knees for 00 minutes, including:    Patellar mobilization    neuromuscular re-education activities to improve: Balance, Coordination, Kinesthetic, Sense, Proprioception, and Posture for 00 minutes. The following activities were included:    SLS with ball toss forward/90 degrees each side x10 mins    Patient Education and Home Exercises     Home Exercises and Patient Education Provided:     Education provided:   - Exercise form and rationale      Written Home Exercises Provided: Patient was instructed to cont with previous HEP  Exercises were reviewed and Damián was able to demonstrate them prior to the end of the session.  Damián demonstrated good understanding of the education provided.     ASSESSMENT   Resistance was decreased with there-ex this visit as not to exacerbate the low back/pt symptoms. LE strength /endurance appears to be improving as Damián was able to complete increased reps with minimal periods of rest between sets.        Damián Is progressing well towards his goals.   Pt prognosis is Excellent.     Pt will continue to benefit " from skilled outpatient physical therapy to address the deficits listed in the problem list box on initial evaluation, provide pt/family education and to maximize pt's level of independence in the home and community environment.     Pt's spiritual, cultural and educational needs considered and pt agreeable to plan of care and goals.     Anticipated barriers to physical therapy: NA    Goals: GOALS:  Short Term Goals:     1.) Pt will improve their FOTO score by 5% to return to PLOF. (Progressing, not met)  2.) Pt will decrease their left knee pain to 4/10 for improved QOL. (met)  3.) Pt will improve their right knee flexion AROM to 125 degrees for improved community ambulation. (Progressing, not met)  4.) Pt will improve their hip abduction strength to 4+/5 to return to their PLOF. ( met)  5.) Pt will become independent with their HEP to improve strength and tolerance to functional activities. (met)     Long Term Goals:  1.) Pt will improve their FOTO score by 10% to return to PLOF. (Progressing, not met)  2.) Pt will decrease their left knee pain to 2/10 for improved QOL. (Progressing, not met)  3.) Pt will improve their left knee flexion AROM to 130 degrees for improved hiking. (Progressing, not met)  4.) Pt will improve their hip flexion strength to 5/5 to return to their PLOF. (Progressing, not met)  5.) Pt will tolerate walking a quarter of a mile with no increase in left knee pain to return to walking dogs. (Progressing, not met)     PLAN   Plan of care Certification: 5/11/23- 7/11/23    Focus on LE strength, knee ROM and ADL performance      Outpatient Physical Therapy 2 times weekly for 4 weeks to include the following interventions: Therapeutic Exercises, Manual Therapeutic Technique, Neuromuscular Re Education, Therapeutic Activities. Modalities, Kinesiotape prn, and Functional Dry Needling as needed.      Lucian Mon, PTA

## 2023-05-19 ENCOUNTER — CLINICAL SUPPORT (OUTPATIENT)
Dept: REHABILITATION | Facility: OTHER | Age: 58
End: 2023-05-19
Payer: MEDICAID

## 2023-05-19 DIAGNOSIS — R29.898 WEAKNESS OF BOTH LOWER EXTREMITIES: Primary | ICD-10-CM

## 2023-05-19 PROBLEM — M79.651 PAIN OF RIGHT THIGH: Status: RESOLVED | Noted: 2022-09-30 | Resolved: 2023-05-19

## 2023-05-19 PROBLEM — M62.81 MUSCLE WEAKNESS OF LOWER EXTREMITY: Status: RESOLVED | Noted: 2022-09-30 | Resolved: 2023-05-19

## 2023-05-19 PROCEDURE — 97110 THERAPEUTIC EXERCISES: CPT | Mod: PN

## 2023-05-19 NOTE — PROGRESS NOTES
"    [facility]   Physical Therapy Daily Treatment Note    Name: Mannie Guerrero  Clinic Number: [id number]    Therapy Diagnosis:   Encounter Diagnosis   Name Primary?    Weakness of both lower extremities Yes       Physician: Glo Jaime MD    Visit Date: 5/19/2023    Physician Orders: Physical Therapy Evaluate and Treat  Medical Diagnosis from Referral: Bilateral primary osteoarthritis of knee [M17.0]  Evaluation Date: 4/5/2023  Authorization Period Expiration: 5/22/23  Plan of Care Expiration: 5/11/23- 7/11/23  Visit # / Visits authorized: 13/12      Time In: 0800am  Time Out: 0900am  Total Billable Time: 60 minutes    SUBJECTIVE   Pt reports: he is experiencing hip pain today. Pt states that this "came out of nowhere" and is bothering him much more than normal.     He was compliant with home exercise program.  Response to previous treatment: felt fine, no issues  Functional change: slightly decreased pain    Pain:  Current 2/10, worst 7/10, best 3/10   Location: bilateral knee   Description: Aching and Dull  Aggravating Factors: Standing and Walking  Easing Factors: lying down and rest     Pts goals: Pt would like to return to hiking with no increase in bilateral knee pain.    OBJECTIVE        Range of Motion:   Knee Left active Left Passive Right Active R passive   Flexion 120 121 122 122   Extension -3 NT -2 NT               Lower Extremity Strength  Right LE   Left LE     Knee extension: 4+/5 Knee extension: 4+/5   Knee flexion: 4/5 Knee flexion: 4/5   Hip flexion: 4+/5 Hip flexion: 4+/5   Hip abduction: 4/5 Hip abduction: 4/5   Hip adduction: 4/5 Hip adduction 4/5                CMS Impairment/Limitation/Restriction for FOTO Survey     Therapist reviewed FOTO scores for Mannie Guerrero on 5/11/23  FOTO documents entered into [employer] - see Media section.     Limitation Score: 41%  Predicted Goal: 33%     Category: Mobility        Treatment     Damián received the treatments " "listed below in bold:      therapeutic exercises to develop strength, endurance, ROM, and core stabilization for  60 minutes including:    Recumbent bike L4 8 minutes- for strength and endurance  Straight leg bridges on blue ball, 3" hold, 20x  Supine knee extension props 3#  R/L Supine hamstring stretch with strap 3x30"  R/L SLR 3# 2x10 R/L  - no weights today  R/L SL hip abduction 3# 2x10 - no weights today  Prone hip extension 3# 2x10  - no weights today  Prone knee flexion with strap 3x30"  Prone femoral nerve glide x20  Shuttle squats 100# 2x20  SL shuttle squats 50# 2x20 R/L  TKE with red power band, 30x R/L  LAQ 5# 30x R/L  Sit to stand from chair, 2x15 + 15# KB  Hesitation marches vs 15# KB 80ftx2 on turf  Step ups on 8" step + 15# KB 20x R/L      manual therapy techniques: Joint mobilizations, Myofacial release, and Soft tissue Mobilization were applied to the: bilateral knees for 00 minutes, including:    Patellar mobilization    neuromuscular re-education activities to improve: Balance, Coordination, Kinesthetic, Sense, Proprioception, and Posture for 00 minutes. The following activities were included:    SLS with ball toss forward/90 degrees each side x10 mins    Patient Education and Home Exercises     Home Exercises and Patient Education Provided:     Education provided:   - Exercise form and rationale      Written Home Exercises Provided: Patient was instructed to cont with previous HEP  Exercises were reviewed and Damián was able to demonstrate them prior to the end of the session.  Damián demonstrated good understanding of the education provided.     ASSESSMENT   Pt tolerated treatment session fairly today. PT advised pt to rest his hip this weekend as he is experiencing a flare up at this time. Continue PT POC.      Damián Is progressing well towards his goals.   Pt prognosis is Excellent.     Pt will continue to benefit from skilled outpatient physical therapy to address the deficits listed in the " problem list box on initial evaluation, provide pt/family education and to maximize pt's level of independence in the home and community environment.     Pt's spiritual, cultural and educational needs considered and pt agreeable to plan of care and goals.     Anticipated barriers to physical therapy: NA    Goals: GOALS:  Short Term Goals:     1.) Pt will improve their FOTO score by 5% to return to PLOF. (Progressing, not met)  2.) Pt will decrease their left knee pain to 4/10 for improved QOL. (met)  3.) Pt will improve their right knee flexion AROM to 125 degrees for improved community ambulation. (Progressing, not met)  4.) Pt will improve their hip abduction strength to 4+/5 to return to their PLOF. ( met)  5.) Pt will become independent with their HEP to improve strength and tolerance to functional activities. (met)     Long Term Goals:  1.) Pt will improve their FOTO score by 10% to return to PLOF. (Progressing, not met)  2.) Pt will decrease their left knee pain to 2/10 for improved QOL. (Progressing, not met)  3.) Pt will improve their left knee flexion AROM to 130 degrees for improved hiking. (Progressing, not met)  4.) Pt will improve their hip flexion strength to 5/5 to return to their PLOF. (Progressing, not met)  5.) Pt will tolerate walking a quarter of a mile with no increase in left knee pain to return to walking dogs. (Progressing, not met)     PLAN   Plan of care Certification: 5/11/23- 7/11/23    Focus on LE strength, knee ROM and ADL performance      Outpatient Physical Therapy 2 times weekly for 4 weeks to include the following interventions: Therapeutic Exercises, Manual Therapeutic Technique, Neuromuscular Re Education, Therapeutic Activities. Modalities, Kinesiotape prn, and Functional Dry Needling as needed.      Nadine Nix, PT

## 2023-05-25 ENCOUNTER — CLINICAL SUPPORT (OUTPATIENT)
Dept: REHABILITATION | Facility: OTHER | Age: 58
End: 2023-05-25
Payer: MEDICAID

## 2023-05-25 DIAGNOSIS — M17.0 PRIMARY OSTEOARTHRITIS OF BOTH KNEES: Primary | ICD-10-CM

## 2023-05-25 PROCEDURE — 97110 THERAPEUTIC EXERCISES: CPT | Mod: PN,CQ

## 2023-05-25 NOTE — PROGRESS NOTES
[facility]   Physical Therapy Daily Treatment Note    Name: Mannie Guerrero  Clinic Number: [id number]    Therapy Diagnosis:   Encounter Diagnosis   Name Primary?    Primary osteoarthritis of both knees Yes         Physician: Glo Jaime MD    Visit Date: 5/25/2023    Physician Orders: Physical Therapy Evaluate and Treat  Medical Diagnosis from Referral: Bilateral primary osteoarthritis of knee [M17.0]  Evaluation Date: 4/5/2023  Authorization Period Expiration: 5/22/23  Plan of Care Expiration: 5/11/23- 7/11/23  Visit # / Visits authorized: 14/12      Time In: 0800  Time Out: 0900  Total Billable Time: 30 minutes    SUBJECTIVE   Pt reports: he is still is feeling continued pain in his R hip, more towards the front of his hip. States he feels like its more like the original pain he was experiencing.     He was compliant with home exercise program.  Response to previous treatment: felt fine, no issues  Functional change: slightly decreased pain    Pain:  Current 4/10, worst 7/10, best 3/10   Location: bilateral knee   Description: Aching and Dull  Aggravating Factors: Standing and Walking  Easing Factors: lying down and rest     Pts goals: Pt would like to return to hiking with no increase in bilateral knee pain.    OBJECTIVE        Range of Motion:   Knee Left active Left Passive Right Active R passive   Flexion 120 121 122 122   Extension -3 NT -2 NT               Lower Extremity Strength  Right LE   Left LE     Knee extension: 4+/5 Knee extension: 4+/5   Knee flexion: 4/5 Knee flexion: 4/5   Hip flexion: 4+/5 Hip flexion: 4+/5   Hip abduction: 4/5 Hip abduction: 4/5   Hip adduction: 4/5 Hip adduction 4/5                CMS Impairment/Limitation/Restriction for FOTO Survey     Therapist reviewed FOTO scores for Mannie Guerrero on 5/11/23  FOTO documents entered into [employer] - see Media section.     Limitation Score: 41%  Predicted Goal: 33%     Category: Mobility     "    Treatment     Damián received the treatments listed below in bold:      therapeutic exercises to develop strength, endurance, ROM, and core stabilization for  22 minutes including:    Recumbent bike L4 8 minutes- for strength and endurance  Straight leg bridges on blue ball, 3" hold, 20x  Supine knee extension props 3#  R/L Supine hamstring stretch with strap 2x60"  R/L SLR 3# 2x10 R/L  - no weights today  R/L SL hip abduction 3# 2x10 - no weights today  Prone hip extension 3# 2x10  - no weights today  Prone knee flexion with strap 2x60"  Prone femoral nerve glide x20  Shuttle squats 100# 2x20  SL shuttle squats 50# 2x20 R/L  TKE with red power band, 30x R/L  LAQ 5# 30x R/L  Sit to stand from chair, 2x15 + 15# KB - held 2* pain  Hesitation marches vs 15# KB 80ftx2 on turf  - held 2* pain  Step ups on 8" step + 15# KB 20x R/L - held 2* pain      manual therapy techniques: Joint mobilizations, Myofacial release, and Soft tissue Mobilization were applied to the: bilateral knees for 8 minutes, including:    R ITB rolling, and MFR with probe to iliopsoas   Patellar mobilization    neuromuscular re-education activities to improve: Balance, Coordination, Kinesthetic, Sense, Proprioception, and Posture for 00 minutes. The following activities were included:    SLS with ball toss forward/90 degrees each side x10 mins    Patient Education and Home Exercises     Home Exercises and Patient Education Provided:     Education provided:   - Exercise form and rationale      Written Home Exercises Provided: Patient was instructed to cont with previous HEP  Exercises were reviewed and Damián was able to demonstrate them prior to the end of the session.  Damián demonstrated good understanding of the education provided.     ASSESSMENT   Pt tolerated exercise well. Muscle weakness with hip abductors were notable primarily with gluteus medius muscle during SL hip abduction. Pt required verbal cuing to avoid rotation of the hip " posteriorly and bringing in lateral quadriceps. Overall Damián appears to be making progress with strength and endurance despite some weakness remaining.  Pt reported pain appeared to be related to musculoskeletal and increased with combination movements of hip flexion+ ER/abd        Damián Is progressing well towards his goals.   Pt prognosis is Excellent.     Pt will continue to benefit from skilled outpatient physical therapy to address the deficits listed in the problem list box on initial evaluation, provide pt/family education and to maximize pt's level of independence in the home and community environment.     Pt's spiritual, cultural and educational needs considered and pt agreeable to plan of care and goals.     Anticipated barriers to physical therapy: NA    Goals: GOALS:  Short Term Goals:     1.) Pt will improve their FOTO score by 5% to return to PLOF. (Progressing, not met)  2.) Pt will decrease their left knee pain to 4/10 for improved QOL. (met)  3.) Pt will improve their right knee flexion AROM to 125 degrees for improved community ambulation. (Progressing, not met)  4.) Pt will improve their hip abduction strength to 4+/5 to return to their PLOF. ( met)  5.) Pt will become independent with their HEP to improve strength and tolerance to functional activities. (met)     Long Term Goals:  1.) Pt will improve their FOTO score by 10% to return to PLOF. (Progressing, not met)  2.) Pt will decrease their left knee pain to 2/10 for improved QOL. (Progressing, not met)  3.) Pt will improve their left knee flexion AROM to 130 degrees for improved hiking. (Progressing, not met)  4.) Pt will improve their hip flexion strength to 5/5 to return to their PLOF. (Progressing, not met)  5.) Pt will tolerate walking a quarter of a mile with no increase in left knee pain to return to walking dogs. (Progressing, not met)     PLAN   Plan of care Certification: 5/11/23- 7/11/23    Focus on LE strength, knee ROM and ADL  performance      Outpatient Physical Therapy 2 times weekly for 4 weeks to include the following interventions: Therapeutic Exercises, Manual Therapeutic Technique, Neuromuscular Re Education, Therapeutic Activities. Modalities, Kinesiotape prn, and Functional Dry Needling as needed.      Lucian Mon, PTA

## 2023-05-30 ENCOUNTER — CLINICAL SUPPORT (OUTPATIENT)
Dept: REHABILITATION | Facility: OTHER | Age: 58
End: 2023-05-30
Payer: MEDICAID

## 2023-05-30 DIAGNOSIS — M17.0 PRIMARY OSTEOARTHRITIS OF BOTH KNEES: Primary | ICD-10-CM

## 2023-05-30 PROCEDURE — 97110 THERAPEUTIC EXERCISES: CPT | Mod: PN,CQ

## 2023-05-30 NOTE — PROGRESS NOTES
[facility]   Physical Therapy Daily Treatment Note    Name: Mannie Guerrero  Clinic Number: [id number]    Therapy Diagnosis:   Encounter Diagnosis   Name Primary?    Primary osteoarthritis of both knees Yes           Physician: Glo Jaime MD    Visit Date: 5/30/2023    Physician Orders: Physical Therapy Evaluate and Treat  Medical Diagnosis from Referral: Bilateral primary osteoarthritis of knee [M17.0]  Evaluation Date: 4/5/2023  Authorization Period Expiration: 5/22/23  Plan of Care Expiration: 5/11/23- 7/11/23  Visit # / Visits authorized: 15/12      Time In: 1000  Time Out: 1100  Total Billable Time: 30 minutes    SUBJECTIVE   Pt reports: he is still is feeling continued pain in his R hip. Feels like PT is not helping much these past few visits. States he still has swelling in his L knee that limits his ability to do stairs or hike for long distances.     He was compliant with home exercise program.  Response to previous treatment: felt fine sore  Functional change: slightly worse R hip pain / L knee swelling     Pain:  Current 5/10, worst 7/10, best 3/10   Location: bilateral knee   Description: Aching and Dull  Aggravating Factors: Standing and Walking  Easing Factors: lying down and rest     Pts goals: Pt would like to return to hiking with no increase in bilateral knee pain.    OBJECTIVE        Range of Motion:   Knee Left active Left Passive Right Active R passive   Flexion 120 121 122 122   Extension -3 NT -2 NT               Lower Extremity Strength  Right LE   Left LE     Knee extension: 4+/5 Knee extension: 4+/5   Knee flexion: 4/5 Knee flexion: 4/5   Hip flexion: 4+/5 Hip flexion: 4+/5   Hip abduction: 4/5 Hip abduction: 4/5   Hip adduction: 4/5 Hip adduction 4/5                CMS Impairment/Limitation/Restriction for FOTO Survey     Therapist reviewed FOTO scores for Mannie Guerrero on 5/11/23  FOTO documents entered into [employer] - see Media section.    "  Limitation Score: 41%  Predicted Goal: 33%     Category: Mobility        Treatment     Damián received the treatments listed below in bold:      therapeutic exercises to develop strength, endurance, ROM, and core stabilization for  22 minutes including:    Recumbent bike L4 8 minutes- for strength and endurance  Straight leg bridges on blue ball, 3" hold, 20x  Supine knee extension props 3#  R/L Supine hamstring stretch with strap 2x60"  R/L SLR 3# 2x10 R/L  - no weights today  R/L SL hip abduction 3# 2x10 - no weights today  Prone hip extension 3# 2x10  - no weights today  +Prone HS curls 20x R/L  Prone knee flexion with strap 2x60"  Prone femoral nerve glide x20  Shuttle squats 100# 2x20  SL shuttle squats 50# 2x20 R/L  TKE with red power band, 30x R/L  LAQ 5# 30x R/L  Sit to stand from chair, 2x15 + 15# KB - held 2* pain  Hesitation marches vs 15# KB 80ftx2 on turf  - held 2* pain  Step ups on 8" step + 15# KB 20x R/L - held 2* pain      manual therapy techniques: Joint mobilizations, Myofacial release, and Soft tissue Mobilization were applied to the: bilateral knees for 8 minutes, including:    L tibiofemoral JM AP glides, Gr III  R LAD to hip/knee  R ITB rolling, and MFR with probe to iliopsoas   Patellar mobilization    neuromuscular re-education activities to improve: Balance, Coordination, Kinesthetic, Sense, Proprioception, and Posture for 00 minutes. The following activities were included:    SLS with ball toss forward/90 degrees each side x10 mins    Patient Education and Home Exercises     Home Exercises and Patient Education Provided:     Education provided:   - Exercise form and rationale      Written Home Exercises Provided: Patient was instructed to cont with previous HEP  Exercises were reviewed and Damián was able to demonstrate them prior to the end of the session.  Damián demonstrated good understanding of the education provided.     ASSESSMENT   Pt tolerated exercise fair . SL hip abduction was " held today secondary to pt reported pain/request. Pt reported pain continues to be a limiting factor from progression in PT. Pt was advised to reach out to his PCP concerning the R hip pain and L knee swelling. PT/PTA face to face conference concerning pt status/[employer] Is progressing well towards his goals.   Pt prognosis is Excellent.     Pt will continue to benefit from skilled outpatient physical therapy to address the deficits listed in the problem list box on initial evaluation, provide pt/family education and to maximize pt's level of independence in the home and community environment.     Pt's spiritual, cultural and educational needs considered and pt agreeable to plan of care and goals.     Anticipated barriers to physical therapy: NA    Goals: GOALS:  Short Term Goals:     1.) Pt will improve their FOTO score by 5% to return to PLOF. (Progressing, not met)  2.) Pt will decrease their left knee pain to 4/10 for improved QOL. (met)  3.) Pt will improve their right knee flexion AROM to 125 degrees for improved community ambulation. (Progressing, not met)  4.) Pt will improve their hip abduction strength to 4+/5 to return to their PLOF. ( met)  5.) Pt will become independent with their HEP to improve strength and tolerance to functional activities. (met)     Long Term Goals:  1.) Pt will improve their FOTO score by 10% to return to PLOF. (Progressing, not met)  2.) Pt will decrease their left knee pain to 2/10 for improved QOL. (Progressing, not met)  3.) Pt will improve their left knee flexion AROM to 130 degrees for improved hiking. (Progressing, not met)  4.) Pt will improve their hip flexion strength to 5/5 to return to their PLOF. (Progressing, not met)  5.) Pt will tolerate walking a quarter of a mile with no increase in left knee pain to return to walking dogs. (Progressing, not met)     PLAN   Plan of care Certification: 5/11/23- 7/11/23    Focus on LE strength, knee ROM and ADL performance       Outpatient Physical Therapy 2 times weekly for 4 weeks to include the following interventions: Therapeutic Exercises, Manual Therapeutic Technique, Neuromuscular Re Education, Therapeutic Activities. Modalities, Kinesiotape prn, and Functional Dry Needling as needed.      Lucian Mon, PTA

## 2023-06-02 ENCOUNTER — CLINICAL SUPPORT (OUTPATIENT)
Dept: REHABILITATION | Facility: OTHER | Age: 58
End: 2023-06-02
Payer: MEDICAID

## 2023-06-02 DIAGNOSIS — R29.898 WEAKNESS OF BOTH LOWER EXTREMITIES: Primary | ICD-10-CM

## 2023-06-02 PROCEDURE — 97110 THERAPEUTIC EXERCISES: CPT | Mod: PN

## 2023-06-02 NOTE — PROGRESS NOTES
[facility]   Physical Therapy Daily Treatment Note    Name: Mannie Guerrero  Clinic Number: [id number]    Therapy Diagnosis:   Encounter Diagnosis   Name Primary?    Weakness of both lower extremities Yes           Physician: Glo Jaime MD    Visit Date: 6/2/2023    Physician Orders: Physical Therapy Evaluate and Treat  Medical Diagnosis from Referral: Bilateral primary osteoarthritis of knee [M17.0]  Evaluation Date: 4/5/2023  Authorization Period Expiration: 5/22/23  Plan of Care Expiration: 5/11/23- 7/11/23  Visit # / Visits authorized: 15/12      Time In: 1100am  Time Out: 1200pm  Total Billable Time: 60 minutes    SUBJECTIVE   Pt reports: that he is doing well today. Pt states that he is experiencing less hip pain. However, his knee is still swollen. Pt reports that he is seeing his physician next week and plans to discuss his knee swelling.   He was compliant with home exercise program.  Response to previous treatment: felt fine sore  Functional change: slightly worse R hip pain / L knee swelling     Pain:  Current 5/10, worst 7/10, best 3/10   Location: bilateral knee   Description: Aching and Dull  Aggravating Factors: Standing and Walking  Easing Factors: lying down and rest     Pts goals: Pt would like to return to hiking with no increase in bilateral knee pain.    OBJECTIVE        Range of Motion:   Knee Left active Left Passive Right Active R passive   Flexion 120 121 122 122   Extension -3 NT -2 NT               Lower Extremity Strength  Right LE   Left LE     Knee extension: 4+/5 Knee extension: 4+/5   Knee flexion: 4/5 Knee flexion: 4/5   Hip flexion: 4+/5 Hip flexion: 4+/5   Hip abduction: 4/5 Hip abduction: 4/5   Hip adduction: 4/5 Hip adduction 4/5                CMS Impairment/Limitation/Restriction for FOTO Survey     Therapist reviewed FOTO scores for Mannie Guerrero on 5/11/23  FOTO documents entered into [employer] - see Media section.     Limitation  "Score: 41%  Predicted Goal: 33%     Category: Mobility        Treatment     Damián received the treatments listed below in bold:      therapeutic exercises to develop strength, endurance, ROM, and core stabilization for 50 minutes including:    Recumbent bike L4 8 minutes- for strength and endurance  Straight leg bridges on blue ball, 3" hold, 20x  Supine knee extension props 3#  R/L Supine hamstring stretch with strap 2x60"  R/L SLR 3# 2x10 R/L  - no weights today  R/L SL hip abduction 3# 2x10 - no weights today  Prone hip extension 3# 2x10  - no weights today  Prone HS curls 20x R/L  Prone knee flexion with strap 2x60"  Prone femoral nerve glide x20  Shuttle squats 100# 2x20  SL shuttle squats 50# 2x20 R/L  TKE with red power band, 30x R/L  LAQ 5# 30x R/L  Sit to stand from chair, 2x15 + 15# KB - held 2* pain  Hesitation marches vs 15# KB 80ftx2 on turf  - held 2* pain  Step ups on 8" step + 15# KB 20x R/L - held 2* pain  +KB marches vs 10# KB 2 laps      manual therapy techniques: Joint mobilizations, Myofacial release, and Soft tissue Mobilization were applied to the: bilateral knees for 0 minutes, including:    L tibiofemoral JM AP glides, Gr III  R LAD to hip/knee  R ITB rolling, and MFR with probe to iliopsoas   Patellar mobilization    neuromuscular re-education activities to improve: Balance, Coordination, Kinesthetic, Sense, Proprioception, and Posture for 10 minutes. The following activities were included:    SLS with ball toss forward/90 degrees each side x10 mins    Patient Education and Home Exercises      Home Exercises and Patient Education Provided:     Education provided:   - Exercise form and rationale      Written Home Exercises Provided: Patient was instructed to cont with previous HEP  Exercises were reviewed and Damián was able to demonstrate them prior to the end of the session.  Damián demonstrated good understanding of the education provided.     ASSESSMENT   Pt tolerated treatment session " well today and completed all exercises with no increase in hip pain. Continue PT [employer] Is progressing well towards his goals.   Pt prognosis is Excellent.     Pt will continue to benefit from skilled outpatient physical therapy to address the deficits listed in the problem list box on initial evaluation, provide pt/family education and to maximize pt's level of independence in the home and community environment.     Pt's spiritual, cultural and educational needs considered and pt agreeable to plan of care and goals.     Anticipated barriers to physical therapy: NA    Goals: GOALS:  Short Term Goals:     1.) Pt will improve their FOTO score by 5% to return to PLOF. (Progressing, not met)  2.) Pt will decrease their left knee pain to 4/10 for improved QOL. (met)  3.) Pt will improve their right knee flexion AROM to 125 degrees for improved community ambulation. (Progressing, not met)  4.) Pt will improve their hip abduction strength to 4+/5 to return to their PLOF. ( met)  5.) Pt will become independent with their HEP to improve strength and tolerance to functional activities. (met)     Long Term Goals:  1.) Pt will improve their FOTO score by 10% to return to PLOF. (Progressing, not met)  2.) Pt will decrease their left knee pain to 2/10 for improved QOL. (Progressing, not met)  3.) Pt will improve their left knee flexion AROM to 130 degrees for improved hiking. (Progressing, not met)  4.) Pt will improve their hip flexion strength to 5/5 to return to their PLOF. (Progressing, not met)  5.) Pt will tolerate walking a quarter of a mile with no increase in left knee pain to return to walking dogs. (Progressing, not met)     PLAN   Plan of care Certification: 5/11/23- 7/11/23    Focus on LE strength, knee ROM and ADL performance      Outpatient Physical Therapy 2 times weekly for 4 weeks to include the following interventions: Therapeutic Exercises, Manual Therapeutic Technique, Neuromuscular Re Education,  Therapeutic Activities. Modalities, Kinesiotape prn, and Functional Dry Needling as needed.      Nadine Nix, PT

## 2023-10-05 DIAGNOSIS — M17.0 BILATERAL PRIMARY OSTEOARTHRITIS OF KNEE: Primary | ICD-10-CM

## 2023-11-02 ENCOUNTER — HOSPITAL ENCOUNTER (EMERGENCY)
Facility: HOSPITAL | Age: 58
Discharge: HOME | End: 2023-11-03
Attending: EMERGENCY MEDICINE
Payer: MEDICAID

## 2023-11-02 DIAGNOSIS — S09.90XA MINOR HEAD INJURY, INITIAL ENCOUNTER: ICD-10-CM

## 2023-11-02 DIAGNOSIS — S60.222A CONTUSION OF LEFT HAND, INITIAL ENCOUNTER: ICD-10-CM

## 2023-11-02 DIAGNOSIS — S01.81XA FACIAL LACERATION, INITIAL ENCOUNTER: Primary | ICD-10-CM

## 2023-11-02 PROCEDURE — 25000003 PHARM REV CODE 250: Performed by: EMERGENCY MEDICINE

## 2023-11-02 PROCEDURE — 99283 EMERGENCY DEPT VISIT LOW MDM: CPT | Mod: 25

## 2023-11-02 RX ORDER — LIDOCAINE HYDROCHLORIDE 10 MG/ML
5 INJECTION INFILTRATION; PERINEURAL
Status: COMPLETED | OUTPATIENT
Start: 2023-11-02 | End: 2023-11-02

## 2023-11-02 RX ADMIN — LIDOCAINE HYDROCHLORIDE 5 ML: 10 INJECTION, SOLUTION INFILTRATION; PERINEURAL at 10:11

## 2023-11-03 VITALS
TEMPERATURE: 98 F | WEIGHT: 260 LBS | DIASTOLIC BLOOD PRESSURE: 87 MMHG | HEART RATE: 90 BPM | SYSTOLIC BLOOD PRESSURE: 147 MMHG | RESPIRATION RATE: 17 BRPM | BODY MASS INDEX: 32.5 KG/M2 | OXYGEN SATURATION: 96 %

## 2023-11-03 PROCEDURE — 12013 RPR F/E/E/N/L/M 2.6-5.0 CM: CPT

## 2023-11-03 NOTE — ED NOTES
Patient identifiers verified and correct for Mannie Guerrero.    LOC: The patient is awake, alert and oriented x 4. Pt is speaking appropriately, no slurred speech.  APPEARANCE: Patient resting comfortably and in no acute distress. Pt is clean and well groomed.   SKIN: laceration noted to forehead. Above left eyebrow.   MUSCULOSKELETAL: Full range of motion present in all extremities. Hand  equal and leg strength strong +2 bilaterally. Reports pain to left wrist  RESPIRATORY: Airway is open and patent. Respirations-unlabored, regular rate, equal bilaterally on inspiration and expiration.   CARDIAC: Patient has regular heart rate and rhythm.  No peripheral edema noted, and patient has no c/o chest pain.  ABDOMEN: Soft and non-tender to palpation with no distention noted. Normoactive bowel sounds X4 quadrants  NEUROLOGIC: Eyes open spontaneously and facial expression symmetrical. Pt behavior appropriate to situation, and pt follows commands.    : No complaints of frequency, burning, urgency or blood in the urine.

## 2023-11-03 NOTE — DISCHARGE INSTRUCTIONS
Follow-up with your primary provider, at an urgent care clinic, or an ER for removal of your stitches in 7-10 days.     We know that you have many choices and are honored that you chose us. We hope that we met or exceeded your expectations and goals for this visit and will keep the [facility] family in mind for your future needs and those of your family and friends.     - Dr. José

## 2023-11-03 NOTE — ED PROVIDER NOTES
Encounter Date: 11/2/2023       History     This history was obtained from the patient without limitations.      He is a 58-year-old with the below past medical history who presents by personal transportation.  He had a fall from standing earlier tonight when he tripped while chasing 1 of his dogs.  He struck his face on the ground.  He denies loss of consciousness.  He used his left hand to break his fall and complains of pain to that palm.  He has a laceration above his left eyebrow.  He denies headache, lightheadedness, dizziness, vision changes, neck pain and stiffness, extremity weakness and numbness, nausea, and vomiting.  He is up-to-date on tetanus immunization.        Review of patient's allergies indicates:  No Known Allergies  Past Medical History:   Diagnosis Date    Basal cell carcinoma of right ala nasi 02/27/2023    Depression     Rheumatoid arthritis, unspecified     knees, wrist     Past Surgical History:   Procedure Laterality Date    HERNIA REPAIR       History reviewed. No pertinent family history.  Social History     Tobacco Use    Smoking status: Never    Smokeless tobacco: Never   Substance Use Topics    Alcohol use: No     Alcohol/week: 0.0 standard drinks of alcohol    Drug use: Yes     Types: Marijuana     Comment: occasionally     Physical Exam     Initial Vitals [11/02/23 2125]   BP Pulse Resp Temp SpO2   (!) 143/90 99 16 98.2 °F (36.8 °C) 95 %      MAP       --         Physical Exam    Nursing note and vitals reviewed.  Constitutional: He is not diaphoretic. No distress.   Eyes: Pupils are equal, round, and reactive to light.   Pulmonary/Chest: No respiratory distress.   Musculoskeletal:      Cervical back: No rigidity. No spinous process tenderness or muscular tenderness.     Neurological: He is alert and oriented to person, place, and time. He has normal strength. No cranial nerve deficit. Gait normal. GCS score is 15. GCS eye subscore is 4. GCS verbal subscore is 5. GCS motor subscore  is 6.   Skin: Skin is warm and dry. No pallor.   3.0 cm laceration superior to the left eyebrow.   Psychiatric: He is not actively hallucinating. He is attentive.         ED Course   Lac Repair    Date/Time: 11/3/2023 12:12 AM    Performed by: Kiran José III, MD  Authorized by: Kiran José III, MD    Consent:     Consent obtained:  Verbal    Consent given by:  Patient  Anesthesia:     Anesthesia method:  Local infiltration    Local anesthetic:  Lidocaine 1% w/o epi  Laceration details:     Location:  Face    Face location:  Forehead    Length (cm):  3    Depth (mm):  4  Pre-procedure details:     Preparation:  Patient was prepped and draped in usual sterile fashion  Exploration:     Limited defect created (wound extended): no      Hemostasis achieved with:  Direct pressure    Wound exploration: entire depth of wound visualized      Wound extent: areolar tissue violated      Wound extent: no fascia violation noted, no foreign bodies/material noted, no muscle damage noted, no nerve damage noted, no underlying fracture noted and no vascular damage noted      Contaminated: no    Treatment:     Area cleansed with:  Saline    Amount of cleaning:  Standard    Irrigation solution:  Sterile saline    Irrigation method:  Syringe    Debridement:  None    Undermining:  None  Skin repair:     Repair method:  Sutures    Suture size:  6-0    Suture material:  Nylon    Suture technique:  Simple interrupted    Number of sutures:  4  Approximation:     Approximation:  Close  Repair type:     Repair type:  Simple  Post-procedure details:     Dressing:  Open (no dressing)    Procedure completion:  Tolerated well, no immediate complications    Labs Reviewed - No data to display       Imaging Results              X-Ray Hand 3 View Left (Final result)  Result time 11/02/23 23:22:04   Procedure changed from X-Ray Hand 2 View Left     Final result by Hema Delgadillo MD (11/02/23 23:22:04)                   Impression:      No  acute fracture identified on two view exam.    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      Electronically signed by: Hema Delgadillo MD  Date:    11/02/2023  Time:    23:22               Narrative:    EXAMINATION:  XR HAND COMPLETE 2 VIEW LEFT    CLINICAL HISTORY:  Lt hand pain; Pain in left hand    TECHNIQUE:  PA and oblique views of the left hand were performed.    COMPARISON:  Left wrist 02/22/2016.    FINDINGS:  No acute fracture or dislocation identified on two view exam.  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      Soft tissues are unremarkable.                                       Medications   LIDOcaine HCL 10 mg/ml (1%) injection 5 mL (has no administration in time range)     Medical Decision Making  Amount and/or Complexity of Data Reviewed  Radiology: ordered. Decision-making details documented in ED Course.    Risk  Prescription drug management.               ED Course as of 11/03/23 0015   u Nov 02, 2023   2316 X-Ray Hand 3 View Left  Independent interpretation:   No acute fractures, dislocation, soft tissue swelling, or radiopaque foreign bodies. [LP]      ED Course User Index  [LP] Kiran José III, MD                    Clinical Impression:   Final diagnoses:  [S60.222A] Contusion of left hand, initial encounter  [S09.90XA] Minor head injury, initial encounter  [S01.81XA] Facial laceration, initial encounter (Primary)        ED Disposition Condition    Discharge Stable          ED Prescriptions    None       Follow-up Information       Follow up With Specialties Details Why Contact Info    ER   Return to the ER for signs of wound infection (severe pain, continuous bleeding or drainage of pus, redness of skin surrounding the wound) or for any other  concerns needing immediate attention.              Kiran José III, MD  11/03/23 0016

## 2023-11-03 NOTE — ED TRIAGE NOTES
Mannie Guerrero, a 58 y.o. male presents to the ED w/ complaint of laceration. States chasing after dog, tripped and fell. C/o left hand pain. -LOC -blood thinners. UTD on tetanus.     Triage note:  Chief Complaint   Patient presents with    Fall     Pt sustained a mechanical fall injuring his L wrist. No obvious deformity + swelling. Pt hit his head on concrete with laceration above L eye. Bleeding controlled. -LOC -blood thinners.     Review of patient's allergies indicates:  No Known Allergies  Past Medical History:   Diagnosis Date    Basal cell carcinoma of right ala nasi 02/27/2023    Depression     Rheumatoid arthritis, unspecified     knees, wrist

## 2023-12-28 ENCOUNTER — TELEPHONE (OUTPATIENT)
Dept: SLEEP MEDICINE | Facility: CLINIC | Age: 58
End: 2023-12-28
Payer: MEDICAID

## 2023-12-28 NOTE — TELEPHONE ENCOUNTER
----- Message from Debby Mishra sent at 12/28/2023  3:54 PM CST -----  Name of Who is Calling:ARUN KWOK [[id number]]                What is the request in detail: Pt calling to set up an virtual  follow up apt, I wasn't able to pull up no apt times for pt.Please call back to further assist.                  Can the clinic reply by [facility]: No                What Number to Call Back if not in [facility]: [phone]    Left message returning call

## 2024-05-31 ENCOUNTER — TELEPHONE (OUTPATIENT)
Dept: ADMINISTRATIVE | Facility: HOSPITAL | Age: 59
End: 2024-05-31
Payer: MEDICAID

## 2024-07-18 ENCOUNTER — OFFICE VISIT (OUTPATIENT)
Dept: SLEEP MEDICINE | Facility: CLINIC | Age: 59
End: 2024-07-18
Attending: FAMILY MEDICINE
Payer: MEDICAID

## 2024-07-18 VITALS
WEIGHT: 260.13 LBS | HEIGHT: 75 IN | HEART RATE: 71 BPM | BODY MASS INDEX: 32.34 KG/M2 | DIASTOLIC BLOOD PRESSURE: 78 MMHG | SYSTOLIC BLOOD PRESSURE: 123 MMHG

## 2024-07-18 DIAGNOSIS — G47.33 OSA (OBSTRUCTIVE SLEEP APNEA): Primary | ICD-10-CM

## 2024-07-18 PROCEDURE — 99212 OFFICE O/P EST SF 10 MIN: CPT | Mod: PBBFAC | Performed by: NURSE PRACTITIONER

## 2024-07-18 PROCEDURE — 3078F DIAST BP <80 MM HG: CPT | Mod: CPTII,,, | Performed by: NURSE PRACTITIONER

## 2024-07-18 PROCEDURE — 99999 PR PBB SHADOW E&M-EST. PATIENT-LVL II: CPT | Mod: PBBFAC,,, | Performed by: NURSE PRACTITIONER

## 2024-07-18 PROCEDURE — 3074F SYST BP LT 130 MM HG: CPT | Mod: CPTII,,, | Performed by: NURSE PRACTITIONER

## 2024-07-18 PROCEDURE — 3008F BODY MASS INDEX DOCD: CPT | Mod: CPTII,,, | Performed by: NURSE PRACTITIONER

## 2024-07-18 PROCEDURE — 99214 OFFICE O/P EST MOD 30 MIN: CPT | Mod: S$PBB,,, | Performed by: NURSE PRACTITIONER

## 2024-07-18 NOTE — PROGRESS NOTES
Cc:JEANNINE    He continues to use [id number] hs. Got machine 2023. Also has old one he travels with and also replacement DS1 motor/not used this one. Needs heated hose.Values his sleep, since using machine not had depressive episodes. Sees psychiatry for mood, on seroquel for sleep. FFM    Remote 30davg 9.4h/n AHI 2.2, 90% tile 15.2cm    CPAP titration study @ [facility] 06/11/2018 CPAP 14 cm  Baseline Sleep Study: PSG/ SPLIT night study  In 2015 [facility].   showed significant JEANNINE with the AHI of 20/hour and SaO2 minimum of 96 %. Pressure 8 cm was advised    Assessment:    JEANNINE, moderate, remains adherent on CPAP,benefits from therapy. AHI<5    Plan:   Continue apap 14-18cm via [employer] supplies/heated hose  Continue to see psychiatry/continue meds  RTC in 12 months, sooner if needed.

## 2024-08-12 ENCOUNTER — TELEPHONE (OUTPATIENT)
Dept: DERMATOLOGY | Facility: CLINIC | Age: 59
End: 2024-08-12
Payer: MEDICAID

## 2024-08-12 NOTE — TELEPHONE ENCOUNTER
Pt is referral from Dr. Burger for BCC R posterior scalp. Left message for pt to give us a call back. Need photo of site.

## 2024-08-13 ENCOUNTER — PATIENT MESSAGE (OUTPATIENT)
Dept: DERMATOLOGY | Facility: CLINIC | Age: 59
End: 2024-08-13
Payer: MEDICAID

## 2024-08-13 ENCOUNTER — TELEPHONE (OUTPATIENT)
Dept: DERMATOLOGY | Facility: CLINIC | Age: 59
End: 2024-08-13
Payer: MEDICAID

## 2024-08-14 ENCOUNTER — TELEPHONE (OUTPATIENT)
Dept: DERMATOLOGY | Facility: CLINIC | Age: 59
End: 2024-08-14
Payer: MEDICAID

## 2024-08-14 NOTE — TELEPHONE ENCOUNTER
Pt referral was sent over from Dr. Jaime for BCC R posterior scalp. Called because we did not have a photo of the lesion. Pt said he is already scheduled with Dr. Ferris for a procedure. Thanked me for the call.

## 2024-08-14 NOTE — TELEPHONE ENCOUNTER
----- Message from Nicolasa Walker sent at 8/14/2024 10:18 AM CDT -----  Regarding: returning missed call  Contact: Pt @ [phone]  Pt is returning a missed call from someone in the office and is asking for a return call back soon. Thanks.

## 2025-02-14 ENCOUNTER — TELEPHONE (OUTPATIENT)
Dept: SLEEP MEDICINE | Facility: CLINIC | Age: 60
End: 2025-02-14
Payer: MEDICAID

## 2025-02-14 NOTE — TELEPHONE ENCOUNTER
Staff contact pt on behalf of message left with call center        ----- Message from Ramya sent at 2/14/2025 11:21 AM CST -----  Regarding: Cpap RX  Type:  Patient Returning Call      Name of who is calling:pt        What is request in detail:Patient is requesting a call back in regards to scheduling an appt to be seen in order to get new RX  for upgraded Cpap machine. Nothing is available until June and patient can't wait that long.        Can clinic reply by [facility]:no        What number to call back [facility]: [phone]

## 2025-04-29 ENCOUNTER — OFFICE VISIT (OUTPATIENT)
Dept: SLEEP MEDICINE | Facility: CLINIC | Age: 60
End: 2025-04-29
Payer: MEDICAID

## 2025-04-29 VITALS — BODY MASS INDEX: 32.34 KG/M2 | HEIGHT: 75 IN | WEIGHT: 260.13 LBS

## 2025-04-29 DIAGNOSIS — G47.33 OSA (OBSTRUCTIVE SLEEP APNEA): Primary | ICD-10-CM

## 2025-04-29 PROCEDURE — 1159F MED LIST DOCD IN RCRD: CPT | Mod: CPTII,,, | Performed by: NURSE PRACTITIONER

## 2025-04-29 PROCEDURE — 99212 OFFICE O/P EST SF 10 MIN: CPT | Mod: PBBFAC | Performed by: NURSE PRACTITIONER

## 2025-04-29 PROCEDURE — 3008F BODY MASS INDEX DOCD: CPT | Mod: CPTII,,, | Performed by: NURSE PRACTITIONER

## 2025-04-29 PROCEDURE — 99214 OFFICE O/P EST MOD 30 MIN: CPT | Mod: S$PBB,,, | Performed by: NURSE PRACTITIONER

## 2025-04-29 PROCEDURE — 99999 PR PBB SHADOW E&M-EST. PATIENT-LVL II: CPT | Mod: PBBFAC,,, | Performed by: NURSE PRACTITIONER

## 2025-04-29 NOTE — PROGRESS NOTES
Cc:JEANNINE    He continues to use feqe05-98cm q hs. Got machine 2023/[employer] 10. Also has old one he travels with. Wonders if anything new on market. DME not sending climate hose.  Values his sleep. Sees psychiatry for mood, off meds since fall 2024.     Remote 30davg 9.4h/n AHI 2.6, 90% tile 15.9cm    CPAP titration study @ [facility] 06/11/2018 CPAP 14 cm  Baseline Sleep Study: PSG/ SPLIT night study  In 2015 [facility].   showed significant JEANNINE with the AHI of 20/hour and SaO2 minimum of 96 %. Pressure 8 cm was advised    Assessment:    JEANNINE, moderate, remains adherent on CPAP,benefits from therapy. AHI<5    Plan:   Continue apap 14-18cm via [employer] supplies/heated hose is MEDICALLY necessary  Continue to see psychiatry accordingly  RTC in 12 months, sooner if needed.